# Patient Record
Sex: MALE | Race: WHITE | NOT HISPANIC OR LATINO | Employment: OTHER | URBAN - NONMETROPOLITAN AREA
[De-identification: names, ages, dates, MRNs, and addresses within clinical notes are randomized per-mention and may not be internally consistent; named-entity substitution may affect disease eponyms.]

---

## 2017-10-26 ENCOUNTER — TRANSFERRED RECORDS (OUTPATIENT)
Dept: HEALTH INFORMATION MANAGEMENT | Facility: HOSPITAL | Age: 72
End: 2017-10-26

## 2017-11-16 ENCOUNTER — HOSPITAL ENCOUNTER (OUTPATIENT)
Dept: OCCUPATIONAL THERAPY | Facility: HOSPITAL | Age: 72
Setting detail: THERAPIES SERIES
End: 2017-11-16
Attending: FAMILY MEDICINE
Payer: MEDICARE

## 2017-11-16 PROCEDURE — G8980 MOBILITY D/C STATUS: HCPCS | Mod: GO,CM

## 2017-11-16 PROCEDURE — 40000118 ZZH STATISTIC OT DEPT VISIT

## 2017-11-16 PROCEDURE — 97166 OT EVAL MOD COMPLEX 45 MIN: CPT | Mod: GO

## 2017-11-16 PROCEDURE — G8979 MOBILITY GOAL STATUS: HCPCS | Mod: GO,CM

## 2017-11-16 PROCEDURE — G8978 MOBILITY CURRENT STATUS: HCPCS | Mod: GO,CM

## 2017-11-28 NOTE — PROGRESS NOTES
" 11/16/17 1000   Quick Adds   Quick Adds Certification;Current Power Wheelchair   General Information (PT: include personal factors and/or comorbidities that impact the POC; OT: include additional occupational profile info)   Rehab Discipline OT   Funding Medicare/[facility] [place]   Service Outpatient;Occupational Therapy;Seating/Wheeled Mobility Evaluation   Height 5' 10.5\"   Weight 193#   Start Of Care Date 11/16/17   Referring Physician Anoop Bowling MD   Orders Evaluate And Treat As Indicated   Orders Date 11/26/17   Others Present at Evaluation RASHAD Salazar   Patient/Caregiver Goals To get a new wheelchair   Rehabilitation Technology Supplier [employer]   Phone Number of Supplier [phone]   Current Community Support Family/Friend Caregiver   Patient role/Employment history Disabled;Retired   Fall Risk Screen   Fall screen completed by OT   Have you fallen 2 or more times in the past year? No   Have you fallen and had an injury in the past year? No   Timed Up and Go score (seconds) unable   Is patient a fall risk? 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 lower extremity edema requiring use of compression socks.   Right shoulder is bone on bone, hypertension   Recent or Planned Surgeries na   Current Power Wheelchair    Quantum 600   Cushion Gel   Settings Used Home;Outdoor Community Mobility;Primary Means of Transportation   Power Wheelchair Comments Pt reports he doesn't want any extra weight added to the wheelchair   Home Accessibility   Living Environment House   Home Accessibility Comments Pt has had full accessibility in the house in current chair   Community ADL   Transportation Adapted Vehicle   Adapted Vehicle Features Ramp;Side Entry;Lock Down System;Adapted Driving " Controls  (electronics on the ramp are not working)   Community Mobility Requirements Medical Appointments;Shopping   Community ADL Comments Pt's wife usually drives pt sits in wheelchair   Cognitive/Visual/Hearing Status   Reported Problems slight memory loss   Cognitive/Vision/Hearing Comments alert and orientented, following directions well   ADL Status   Feeding Independent   Grooming/Hygiene Independent   Dressing Independent  (slow)   Toileting Uses Equipment;Incontinent  (uses oxybutin)   Bathing Uses Equipment   Meal Preparation Independent  (wife does most but he can)   Home Management Requires Assist   Fatigue   Reported Problems Pt reports he paces himself   Balance   Unsupported Sitting Balance Uses Upper Extremities for Balance   Sitting Balance in Chair Uses Upper Extremities for Balance   Standing Balance Unable   Ambulation   Ambulation Non Ambulatory   Transfers   Transfer Assist Independent   Transfer Method Squat Pivot/Scoot Boost;Sliding Board   Transfer Comments uses a stander 3x week for stretching and strengthening   Sleep/Rest   Sleep Surface/Equipment standard bed with pillow between legs   Wheelchair Ability   Wheelchair Ability Quick Adds Power Chair   Power Wheelchair Propulsion   Operate Power Wheelchair Standard Joystick;Alternative Controls;Independent   Neuromuscular   History of Pressure Sores Yes   Current Pressure Sores Yes   Pressure Sore location left ischial tuberosity   Pressure Sore type/size dime size   Pain Yes   Pain Location right shoulder   Pain Scale  6   Additional Pain Locations? Yes   Coordination UE Impaired;LE Impaired   Tone Hypotonic   Sensory Deficits Reported neuropathy, able to feel    Sensation on Seating Surface Impaired per Report   Head and Neck   Head and Neck Position Functional   Head Control Good   Upper Extremities   Shoulder Position Shoulder Depressed;Shoulder Protracted   UE ROM Shoulder ROM is WNL, elbow flexion 148 bilaterally, unable to extend  long and ring finger on right    UE Strength 4/5 at shoulders and elbows.    left 43#, right 24#   Dominance Right   Lower Extremities   Hip Position Neutral   Hip Position-Windswept Neutral   LE ROM no AROM   LE Strength 0/10   Foot Positioning WFL   LE Comments paraplegia   Education Assessment   Barriers to Learning No Barriers   Assessment/Plan   Criteria for Skilled Interventions Met Evaluation Only   Treatment Diagnosis impaired mobility   Influenced by the Following Impairments non ambulatory   Assessment of Occupational Performance 5 or more Performance Deficits   Identified Performance Deficits mobility within his home, toileting, sitting balance, skin integrity   Clinical Decision Making (Complexity) Moderate complexity   Planned Therapy Interventions Comments Recommend pt acquire a Group 3 Midwheel drive power wheelchair with tilt and recline for pressure relief and postural changes d/t h/o pressure ulcers and leg edema;  Positioning back for postural control and support; Headrest pad and removeable hardware to support head during tilt and transit and removable for transfers;  Power center mount elevating legrest with 2 actuators to support legs during tilt and reduce edema through positioning;  pressure relieving positioning cushion with gel insert d/t his history of pressure ulcers; incontinence cover d/t pt incontenence;  Transit tie downs and transit lap belt for safety during transportation; batteries to operate the chair, Retractable Joystick Mount to access environment and enable safe transfers, Tilt and Recline Actuators with Power Shear Reduction to operate the tilt and recline features; Thru Drive Control 2+ Actuators for operating power seating functions   Patient/family & other staff in agreement with plan of care Yes   1x Eval Only-Outpatient/Observation Medicare G-Code   G-code Mobility: Walking & Moving Around   Mobility: Walking & Moving Around   Mobility: Current Status , Goal  , Discharge -Evgx Only- Modifier the same for all G-codes CM: 80-99% impairment   Mobility: Current & Discharge Modifier Rationale-Eval Only pt dependent upon powered mobility for all mobility   Session Time   Total Evaluation Time 60   Total Session Time 60   Certification   Certification date from 11/16/17   Certification date to 11/16/17   Medical Diagnosis impaired mobility   Certification I certify the need for these services furnished under this plan of treatment and while under my care.  (Physician co-signature of this document indicates review and certification of the therapy plan).   GOALS   Goals Patient/family demonstrates understanding of equipment for independent mobility, including benefits/limitations   I certify the need for these services furnished under this plan of treatment and while under my care. (Physician co-signature of this document indicates review and certification of the therapy plan).      _____________________________     __________________________    ____________  Physician's Signature                 Date               Time

## 2019-11-23 ENCOUNTER — APPOINTMENT (OUTPATIENT)
Dept: GENERAL RADIOLOGY | Facility: HOSPITAL | Age: 74
End: 2019-11-23
Attending: EMERGENCY MEDICINE
Payer: MEDICARE

## 2019-11-23 ENCOUNTER — HOSPITAL ENCOUNTER (OUTPATIENT)
Facility: HOSPITAL | Age: 74
Setting detail: OBSERVATION
Discharge: HOME | End: 2019-11-25
Attending: FAMILY MEDICINE | Admitting: INTERNAL MEDICINE
Payer: MEDICARE

## 2019-11-23 ENCOUNTER — APPOINTMENT (OUTPATIENT)
Dept: CT IMAGING | Facility: HOSPITAL | Age: 74
End: 2019-11-23
Attending: EMERGENCY MEDICINE
Payer: MEDICARE

## 2019-11-23 DIAGNOSIS — I63.9 CEREBROVASCULAR ACCIDENT (CVA), UNSPECIFIED MECHANISM (H): ICD-10-CM

## 2019-11-23 DIAGNOSIS — I16.1 HYPERTENSIVE EMERGENCY: ICD-10-CM

## 2019-11-23 PROBLEM — R47.81 SLURRED SPEECH: Status: ACTIVE | Noted: 2019-11-23

## 2019-11-23 LAB
ABO + RH BLD: NORMAL
ABO + RH BLD: NORMAL
ALBUMIN SERPL-MCNC: 4 G/DL (ref 3.4–5)
ALP SERPL-CCNC: 80 U/L (ref 40–150)
ALT SERPL W P-5'-P-CCNC: 32 U/L (ref 0–70)
ANION GAP SERPL CALCULATED.3IONS-SCNC: 5 MMOL/L (ref 3–14)
APTT PPP: 40 SEC (ref 22–37)
AST SERPL W P-5'-P-CCNC: 22 U/L (ref 0–45)
BASOPHILS # BLD AUTO: 0 10E9/L (ref 0–0.2)
BASOPHILS NFR BLD AUTO: 0.6 %
BILIRUB SERPL-MCNC: 0.6 MG/DL (ref 0.2–1.3)
BLD GP AB SCN SERPL QL: NORMAL
BLOOD BANK CMNT PATIENT-IMP: NORMAL
BLOOD BANK CMNT PATIENT-IMP: NORMAL
BUN SERPL-MCNC: 12 MG/DL (ref 7–30)
CALCIUM SERPL-MCNC: 8.8 MG/DL (ref 8.5–10.1)
CHLORIDE SERPL-SCNC: 104 MMOL/L (ref 94–109)
CO2 SERPL-SCNC: 29 MMOL/L (ref 20–32)
CREAT SERPL-MCNC: 0.61 MG/DL (ref 0.66–1.25)
DIFFERENTIAL METHOD BLD: NORMAL
EOSINOPHIL # BLD AUTO: 0.1 10E9/L (ref 0–0.7)
EOSINOPHIL NFR BLD AUTO: 1.5 %
ERYTHROCYTE [DISTWIDTH] IN BLOOD BY AUTOMATED COUNT: 14.8 % (ref 10–15)
EST. AVERAGE GLUCOSE BLD GHB EST-MCNC: 137 MG/DL
GFR SERPL CREATININE-BSD FRML MDRD: >90 ML/MIN/{1.73_M2}
GLUCOSE BLDC GLUCOMTR-MCNC: 118 MG/DL (ref 70–99)
GLUCOSE BLDC GLUCOMTR-MCNC: 129 MG/DL (ref 70–99)
GLUCOSE BLDC GLUCOMTR-MCNC: 147 MG/DL (ref 70–99)
GLUCOSE SERPL-MCNC: 139 MG/DL (ref 70–99)
HBA1C MFR BLD: 6.4 % (ref 0–5.6)
HCT VFR BLD AUTO: 42.1 % (ref 40–53)
HGB BLD-MCNC: 14.9 G/DL (ref 13.3–17.7)
IMM GRANULOCYTES # BLD: 0 10E9/L (ref 0–0.4)
IMM GRANULOCYTES NFR BLD: 0.4 %
INR PPP: 2.4 (ref 0.86–1.14)
LYMPHOCYTES # BLD AUTO: 1.6 10E9/L (ref 0.8–5.3)
LYMPHOCYTES NFR BLD AUTO: 22.3 %
MCH RBC QN AUTO: 28.4 PG (ref 26.5–33)
MCHC RBC AUTO-ENTMCNC: 35.4 G/DL (ref 31.5–36.5)
MCV RBC AUTO: 80 FL (ref 78–100)
MONOCYTES # BLD AUTO: 0.6 10E9/L (ref 0–1.3)
MONOCYTES NFR BLD AUTO: 8.6 %
NEUTROPHILS # BLD AUTO: 4.8 10E9/L (ref 1.6–8.3)
NEUTROPHILS NFR BLD AUTO: 66.6 %
NRBC # BLD AUTO: 0 10*3/UL
NRBC BLD AUTO-RTO: 0 /100
PLATELET # BLD AUTO: 345 10E9/L (ref 150–450)
POTASSIUM SERPL-SCNC: 3.6 MMOL/L (ref 3.4–5.3)
PROT SERPL-MCNC: 8.7 G/DL (ref 6.8–8.8)
RBC # BLD AUTO: 5.25 10E12/L (ref 4.4–5.9)
SODIUM SERPL-SCNC: 138 MMOL/L (ref 133–144)
SPECIMEN EXP DATE BLD: NORMAL
TROPONIN I SERPL-MCNC: <0.015 UG/L (ref 0–0.04)
WBC # BLD AUTO: 7.2 10E9/L (ref 4–11)

## 2019-11-23 PROCEDURE — 99220 ZZC INITIAL OBSERVATION CARE,LEVL III: CPT | Performed by: INTERNAL MEDICINE

## 2019-11-23 PROCEDURE — 70498 CT ANGIOGRAPHY NECK: CPT | Mod: TC

## 2019-11-23 PROCEDURE — 25500064 ZZH RX 255 OP 636: Performed by: FAMILY MEDICINE

## 2019-11-23 PROCEDURE — 93005 ELECTROCARDIOGRAM TRACING: CPT

## 2019-11-23 PROCEDURE — 71045 X-RAY EXAM CHEST 1 VIEW: CPT | Mod: TC

## 2019-11-23 PROCEDURE — 96376 TX/PRO/DX INJ SAME DRUG ADON: CPT | Mod: 59

## 2019-11-23 PROCEDURE — 86901 BLOOD TYPING SEROLOGIC RH(D): CPT | Performed by: EMERGENCY MEDICINE

## 2019-11-23 PROCEDURE — 99291 CRITICAL CARE FIRST HOUR: CPT | Mod: 25

## 2019-11-23 PROCEDURE — 00000146 ZZHCL STATISTIC GLUCOSE BY METER IP

## 2019-11-23 PROCEDURE — 40000788 ZZHCL STATISTIC ESTIMATED AVERAGE GLUCOSE: Performed by: INTERNAL MEDICINE

## 2019-11-23 PROCEDURE — G0378 HOSPITAL OBSERVATION PER HR: HCPCS

## 2019-11-23 PROCEDURE — 86850 RBC ANTIBODY SCREEN: CPT | Performed by: EMERGENCY MEDICINE

## 2019-11-23 PROCEDURE — 25000132 ZZH RX MED GY IP 250 OP 250 PS 637: Mod: GY | Performed by: INTERNAL MEDICINE

## 2019-11-23 PROCEDURE — 86900 BLOOD TYPING SEROLOGIC ABO: CPT | Performed by: EMERGENCY MEDICINE

## 2019-11-23 PROCEDURE — 85025 COMPLETE CBC W/AUTO DIFF WBC: CPT | Performed by: EMERGENCY MEDICINE

## 2019-11-23 PROCEDURE — 25000128 H RX IP 250 OP 636: Performed by: INTERNAL MEDICINE

## 2019-11-23 PROCEDURE — 70450 CT HEAD/BRAIN W/O DYE: CPT | Mod: TC,59

## 2019-11-23 PROCEDURE — 99291 CRITICAL CARE FIRST HOUR: CPT | Performed by: FAMILY MEDICINE

## 2019-11-23 PROCEDURE — 25000128 H RX IP 250 OP 636: Performed by: FAMILY MEDICINE

## 2019-11-23 PROCEDURE — 96374 THER/PROPH/DIAG INJ IV PUSH: CPT | Mod: 59

## 2019-11-23 PROCEDURE — 80053 COMPREHEN METABOLIC PANEL: CPT | Performed by: EMERGENCY MEDICINE

## 2019-11-23 PROCEDURE — 83036 HEMOGLOBIN GLYCOSYLATED A1C: CPT | Performed by: INTERNAL MEDICINE

## 2019-11-23 PROCEDURE — 36415 COLL VENOUS BLD VENIPUNCTURE: CPT | Performed by: INTERNAL MEDICINE

## 2019-11-23 PROCEDURE — 84484 ASSAY OF TROPONIN QUANT: CPT | Performed by: EMERGENCY MEDICINE

## 2019-11-23 PROCEDURE — 93010 ELECTROCARDIOGRAM REPORT: CPT | Performed by: INTERNAL MEDICINE

## 2019-11-23 PROCEDURE — 85730 THROMBOPLASTIN TIME PARTIAL: CPT | Performed by: EMERGENCY MEDICINE

## 2019-11-23 PROCEDURE — 85610 PROTHROMBIN TIME: CPT | Performed by: EMERGENCY MEDICINE

## 2019-11-23 RX ORDER — LIDOCAINE 40 MG/G
CREAM TOPICAL
Status: DISCONTINUED | OUTPATIENT
Start: 2019-11-23 | End: 2019-11-25 | Stop reason: HOSPADM

## 2019-11-23 RX ORDER — ACETAMINOPHEN 325 MG/1
650 TABLET ORAL EVERY 4 HOURS PRN
Status: DISCONTINUED | OUTPATIENT
Start: 2019-11-23 | End: 2019-11-25 | Stop reason: HOSPADM

## 2019-11-23 RX ORDER — ASPIRIN 325 MG
325 TABLET ORAL DAILY
Status: DISCONTINUED | OUTPATIENT
Start: 2019-11-23 | End: 2019-11-25 | Stop reason: HOSPADM

## 2019-11-23 RX ORDER — ONDANSETRON 4 MG/1
4 TABLET, ORALLY DISINTEGRATING ORAL EVERY 6 HOURS PRN
Status: DISCONTINUED | OUTPATIENT
Start: 2019-11-23 | End: 2019-11-25 | Stop reason: HOSPADM

## 2019-11-23 RX ORDER — OXYBUTYNIN CHLORIDE 5 MG/1
5 TABLET, EXTENDED RELEASE ORAL DAILY
Status: DISCONTINUED | OUTPATIENT
Start: 2019-11-23 | End: 2019-11-25 | Stop reason: HOSPADM

## 2019-11-23 RX ORDER — DEXTROSE MONOHYDRATE 25 G/50ML
25-50 INJECTION, SOLUTION INTRAVENOUS
Status: DISCONTINUED | OUTPATIENT
Start: 2019-11-23 | End: 2019-11-25 | Stop reason: HOSPADM

## 2019-11-23 RX ORDER — LABETALOL 20 MG/4 ML (5 MG/ML) INTRAVENOUS SYRINGE
10 EVERY 10 MIN PRN
Status: DISCONTINUED | OUTPATIENT
Start: 2019-11-23 | End: 2019-11-23

## 2019-11-23 RX ORDER — LOSARTAN POTASSIUM 100 MG/1
100 TABLET ORAL DAILY
COMMUNITY

## 2019-11-23 RX ORDER — GLYBURIDE 5 MG/1
10 TABLET ORAL 2 TIMES DAILY WITH MEALS
COMMUNITY

## 2019-11-23 RX ORDER — ACETAMINOPHEN 650 MG/1
650 SUPPOSITORY RECTAL EVERY 4 HOURS PRN
Status: DISCONTINUED | OUTPATIENT
Start: 2019-11-23 | End: 2019-11-25 | Stop reason: HOSPADM

## 2019-11-23 RX ORDER — LABETALOL 20 MG/4 ML (5 MG/ML) INTRAVENOUS SYRINGE
10 EVERY 4 HOURS PRN
Status: DISCONTINUED | OUTPATIENT
Start: 2019-11-23 | End: 2019-11-25 | Stop reason: HOSPADM

## 2019-11-23 RX ORDER — ATENOLOL 25 MG/1
50 TABLET ORAL DAILY
Status: DISCONTINUED | OUTPATIENT
Start: 2019-11-23 | End: 2019-11-25 | Stop reason: HOSPADM

## 2019-11-23 RX ORDER — NICOTINE POLACRILEX 4 MG
15-30 LOZENGE BUCCAL
Status: DISCONTINUED | OUTPATIENT
Start: 2019-11-23 | End: 2019-11-25 | Stop reason: HOSPADM

## 2019-11-23 RX ORDER — NALOXONE HYDROCHLORIDE 0.4 MG/ML
.1-.4 INJECTION, SOLUTION INTRAMUSCULAR; INTRAVENOUS; SUBCUTANEOUS
Status: DISCONTINUED | OUTPATIENT
Start: 2019-11-23 | End: 2019-11-25 | Stop reason: HOSPADM

## 2019-11-23 RX ORDER — ONDANSETRON 2 MG/ML
4 INJECTION INTRAMUSCULAR; INTRAVENOUS EVERY 6 HOURS PRN
Status: DISCONTINUED | OUTPATIENT
Start: 2019-11-23 | End: 2019-11-25 | Stop reason: HOSPADM

## 2019-11-23 RX ADMIN — LABETALOL 20 MG/4 ML (5 MG/ML) INTRAVENOUS SYRINGE 10 MG: at 11:00

## 2019-11-23 RX ADMIN — OXYBUTYNIN CHLORIDE 5 MG: 5 TABLET, EXTENDED RELEASE ORAL at 20:24

## 2019-11-23 RX ADMIN — LABETALOL 20 MG/4 ML (5 MG/ML) INTRAVENOUS SYRINGE 10 MG: at 09:36

## 2019-11-23 RX ADMIN — LABETALOL 20 MG/4 ML (5 MG/ML) INTRAVENOUS SYRINGE 10 MG: at 16:33

## 2019-11-23 RX ADMIN — LABETALOL 20 MG/4 ML (5 MG/ML) INTRAVENOUS SYRINGE 10 MG: at 09:02

## 2019-11-23 RX ADMIN — IOHEXOL 50 ML: 350 INJECTION, SOLUTION INTRAVENOUS at 08:32

## 2019-11-23 RX ADMIN — LABETALOL 20 MG/4 ML (5 MG/ML) INTRAVENOUS SYRINGE 10 MG: at 21:15

## 2019-11-23 RX ADMIN — LABETALOL 20 MG/4 ML (5 MG/ML) INTRAVENOUS SYRINGE 10 MG: at 08:47

## 2019-11-23 RX ADMIN — ATENOLOL 50 MG: 25 TABLET ORAL at 17:41

## 2019-11-23 RX ADMIN — LABETALOL 20 MG/4 ML (5 MG/ML) INTRAVENOUS SYRINGE 10 MG: at 09:19

## 2019-11-23 RX ADMIN — ASPIRIN 325 MG ORAL TABLET 325 MG: 325 PILL ORAL at 12:36

## 2019-11-23 RX ADMIN — WARFARIN SODIUM 1.5 MG: 3 TABLET ORAL at 20:24

## 2019-11-23 ASSESSMENT — ENCOUNTER SYMPTOMS
DYSURIA: 0
FEVER: 0
ABDOMINAL PAIN: 0
DIZZINESS: 0
WHEEZING: 0
SORE THROAT: 0
COUGH: 0
CHILLS: 0
SHORTNESS OF BREATH: 0
WEAKNESS: 1
NECK PAIN: 1
NAUSEA: 0
PALPITATIONS: 0
BACK PAIN: 1
DIARRHEA: 0
VOMITING: 0

## 2019-11-23 ASSESSMENT — MIFFLIN-ST. JEOR: SCORE: 1634.13

## 2019-11-23 NOTE — PROGRESS NOTES
BP slowly improving- down to 180/100. Patient switched to tele box to make it easier for him to adjust and move himself. Neuro assessment remains the same as previously charted.

## 2019-11-23 NOTE — PHARMACY-ANTICOAGULATION SERVICE
Clinical Pharmacy - Warfarin Dosing Consult  Pharmacy has been consulted to manage this patient s warfarin therapy.  Indication: DVT/PE Prophylaxis  Therapy Goal: INR 2-3  OP Anticoag Clinic: . [facility]   Warfarin Prior to Admission: Yes  Warfarin PTA Regimen: 1 mg on Monday, 1.5 mg ROW  Significant drug interactions: ibuprofen, omeprazole, ASA  Dose Comments: Information needs to be clarified with [facility] once open on Monday     INR   Date Value Ref Range Status   11/23/2019 2.40 (H) 0.86 - 1.14 Final   06/23/2016 1.35 (H) 0.80 - 1.20 Final     Recommend warfarin 1.5 mg today.  Pharmacy will monitor Clem Bishop daily and order warfarin doses to achieve specified goal.      Please contact pharmacy as soon as possible if the warfarin needs to be held for a procedure or if the warfarin goals change.

## 2019-11-23 NOTE — PROGRESS NOTES
[facility] Inpatient Admission Note:    Patient admitted to 3128/3128-1 at approximately 1015 via cart accompanied by spouse from emergency room . Report received from Consuelo ROBERTS in SBAR format at 1030 via face to face in room. Patient transferred to bed via slide board.. Patient is alert and oriented X 3, denies pain; rates at 0 on 0-10 scale.  Patient oriented to room, unit, hourly rounding, and plan of care. Explained admission packet and patient handbook with patient bill of rights brochure. Will continue to monitor and document as needed.     Inpatient Nursing criteria listed below was met:    Health care directives status obtained and documented: Yes    Care Everywhere authorization obtained No    MRSA swab completed for patient 65 years and older: Yes    Patient identifies a surrogate decision maker: Yes If yes, who:Spouse- Myranda Contact Information:[phone]     If initial lactic acid >2.0, repeat lactic acid drawn within one hour of arrival to unit: NA. If no, state reason: N/A    Vaccination assessment and education completed: Yes   Vaccinations received prior to admission: Pneumovax yes  Influenza(seasonal)  YES   Vaccination(s) ordered: patient declines    Clergy visit ordered if patient requests: No    Skin issues/needs documented: N/A    Isolation Patient: no Education given, correct sign in place and documentation row added to PCS:  No    Fall Prevention Yes: Care plan updated, education given and documented, sticker and magnet in place: Yes    Care Plan initiated: Yes    Education Documented (including assessment): Yes    Patient has discharge needs : Yes If yes, please explain:Unsure at this time.

## 2019-11-23 NOTE — PROGRESS NOTES
Dr. Brooke updated on continued hypertension despite patient receiving another dose of Labetalol. New order placed for patient's home dose of PO Atenolol.

## 2019-11-23 NOTE — ED NOTES
Presents with wife with reports of slurred speech and R sided weakness since 2200 last HS. Also reports his R side feels numb. 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 Wife reports patient fell this morning due to weakness.

## 2019-11-23 NOTE — ED PROVIDER NOTES
History     Chief Complaint   Patient presents with     Slurred Speech     HPI  Clem Bishop is a 74 year old male who has a history of neurosarcoidosis, PE and DVT (coumadin).  He and his wife state that he has been paraplegic since 2005--he has control of his bladder and his bowels.  He does not know what level his paraplegia begins at.  He states that he has no movement of either leg.  He states that he started having symptoms last night at 2200 where his right arm felt weaker than usual, slurred speech and numbness on right side of mouth.  He awoke at 0530 and was unable to transfer himself and fell to his knees.  He did not hit his head nor his neck and he did not lose consciousness.    He says his right hand is numb.  He has no numbness above the wrist.  He has weakness of the right arm.  Wife also states that he was slurring his speech.  I do understand his speech without any difficulty.  She states that the slurred speech is still present.    He denies any recent illnesses.  He states that the back of his neck is sore but that happens intermittently.  He does have an L5 herniated disc.  Wife also states that his abdomen is more swollen left belt on him today.  The last time she had used that was about 6 months ago.    Social history  11/19: Clem lives at home with his wife.  He has been in a wheelchair since 2005.    Allergies:  No Known Allergies    Problem List:    There are no active problems to display for this patient.       Past Medical History:    Past Medical History:   Diagnosis Date     Hypertension        Past Surgical History:    Past Surgical History:   Procedure Laterality Date     COLONOSCOPY - HIM SCAN  04/19/2006    Colonoscopy at the [facility] ER 4/16/2006     EXCISE LESION EAR EXTERNAL Left 6/23/2016    Procedure: EXCISE LESION EAR EXTERNAL;  Surgeon: Von Suresh DO;  Location: [facility] OR     FLEXIBLE SIGMOIDOSCOPY - HIM SCAN  03/24/2000    Flex Sigmoidoscopy - Hyperplastic polyp        Family History:    No family history on file.    Social History:  Marital Status:   [2]  Social History     Tobacco Use     Smoking status: Not on file   Substance Use Topics     Alcohol use: Not on file     Drug use: Not on file        Medications:    atenolol (TENORMIN) 50 MG tablet  Calcium Carbonate (CALCIUM 600 PO)  METFORMIN HCL PO  OMEPRAZOLE PO  oxybutynin (DITROPAN-XL) 5 MG 24 hr tablet  VITAMIN D, CHOLECALCIFEROL, PO  WARFARIN SODIUM PO  ibuprofen (ADVIL,MOTRIN) 600 MG tablet          Review of Systems   Constitutional: Negative for chills and fever.   HENT: Negative for congestion, ear pain and sore throat.    Respiratory: Negative for cough, shortness of breath and wheezing.    Cardiovascular: Negative for chest pain and palpitations.   Gastrointestinal: Negative for abdominal pain, diarrhea, nausea and vomiting.   Genitourinary: Negative for dysuria.   Musculoskeletal: Positive for back pain (chronic) and neck pain (chronic).   Skin: Negative for rash.   Neurological: Positive for weakness. Negative for dizziness.   Psychiatric/Behavioral:        No depression or anxiety.       Physical Exam   BP: (!) 224/124  Pulse: 85  Heart Rate: 91  Temp: 97.4  F (36.3  C)  Resp: 18  Weight: 82.6 kg (182 lb)  SpO2: 95 %      Physical Exam  Vitals signs and nursing note reviewed.   Constitutional:       General: He is not in acute distress.     Appearance: He is well-developed.   HENT:      Head: Normocephalic and atraumatic.   Neck:      Musculoskeletal: Neck supple.   Cardiovascular:      Rate and Rhythm: Normal rate and regular rhythm.      Heart sounds: Normal heart sounds. No murmur. No friction rub. No gallop.    Pulmonary:      Effort: Pulmonary effort is normal. No respiratory distress.      Breath sounds: Normal breath sounds.   Abdominal:      Tenderness: There is no abdominal tenderness. There is no guarding or rebound.   Lymphadenopathy:      Cervical: No cervical adenopathy.   Skin:      General: Skin is warm and dry.   Neurological:      Mental Status: He is alert and oriented to person, place, and time.      Comments: Motor examination: the station and gait are normal.  The strength is full throughout, the bulk and tone are normal and there are no abnormal movements.s  The muscle strength is 5/5 in BUE and 0/5 BLE.    Neurological examination: mental status testing revealed  That the patient was awake and alert, the attention, orientation, concentration, language, memory and fund of knowledge were all normal.  The patient had no neglect or apraxia.    Cranial nerve examination: revealed that for cranial nerve   II: the pupils were reactive and the visual field were full  III, IV, and VI, the extraocular movements were full.    V: facial sensation intact in all 3 distributions of the 5th cranial nerve to touch  VII: facial movements are symmetric  VIII: hearing intact to snapping  IX & X: the soft palate is symmetric  XI: shoulder movements are symmetric  XII: tongue is midline.               ED Course        Procedures               Patient Vitals for the past 24 hrs:   BP Temp Temp src Pulse Heart Rate Resp SpO2 Weight   11/23/19 0916 (!) 189/101 97.8  F (36.6  C) Tympanic 86 -- 18 95 % --   11/23/19 0910 180/97 -- -- 85 86 15 96 % --   11/23/19 0905 (!) 179/102 -- -- 88 88 -- 94 % --   11/23/19 0900 (!) 203/107 -- -- 86 86 -- 95 % --   11/23/19 0856 -- -- -- -- 96 19 95 % --   11/23/19 0855 (!) 214/113 -- -- 90 92 -- 96 % --   11/23/19 0852 (!) 223/135 -- -- 93 -- 18 96 % --   11/23/19 0850 -- -- -- 93 105 14 96 % --   11/23/19 0845 (!) 217/119 -- -- 89 87 -- 94 % --   11/23/19 0843 -- -- -- -- 87 16 95 % --   11/23/19 0840 (!) 211/114 -- -- 85 84 -- 95 % --   11/23/19 0830 (!) 221/122 -- -- -- 91 -- 96 % --   11/23/19 0825 (!) 220/119 -- -- -- 90 -- 96 % --   11/23/19 0819 (!) 206/113 -- -- -- 88 18 96 % --   11/23/19 0814 (!) 251/123 97.4  F (36.3  C) Tympanic -- -- -- -- --   11/23/19 0811  (!) 251/123 -- -- -- 90 -- -- --   11/23/19 0804 (!) 224/124 -- -- -- 91 -- 95 % 82.6 kg (182 lb)       LABORATORY (REVIEWED AND INTERPRETED):  CBC BMP Liver Panel   Recent Labs   Lab Test 11/23/19 0813   WBC 7.2   HGB 14.9       Recent Labs   Lab Test 11/23/19 0813   POTASSIUM 3.6   CHLORIDE 104   BUN 12    Recent Labs   Lab Test 11/23/19 0813   BILITOTAL 0.6   ALT 32   AST 22        UA     DIP MICRO   No lab results found.    Invalid input(s): SPECGAV, GLUCONSUA, KETONESUA, BLOODUA, LEUKOCYTE Invalid input(s): RBCUA, WBCUA, BACTERIAUA, EPITHELIALUA       OTHER LABS   Recent Labs   Lab Test 11/23/19 0813   INR 2.40*            INTERVENTIONS:  Medications   labetalol (NORMODYNE/TRANDATE) syringe 10 mg (10 mg Intravenous Given 11/23/19 0919)   iohexol (OMNIPAQUE) 350 mg/mL solution 50 mL (50 mLs Intravenous Given 11/23/19 0832)   sodium chloride (PF) 0.9% PF flush 100 mL (100 mLs Intravenous Given 11/23/19 0833)       ECG (Reviewed and Interpreted by me):  NSR at 86 bpm.  LAD, RBBB    IMAGING (Reviewed and Interpreted by me):  Head  CT: negative  CTA: negative.     ED COURSE:  The patient has been seen and evaluated by me.  I have reviewed the medical records.      I spoke to Dr. Jacome, neurologist at the [facility]. He doesn't qualify for lytics    8:48 AM I heard from radiology and CT is negative.  He continues to have slightly slurred speech.  I do not objectively notice a difference in his strength between his right and left arms.  He states that his right arm is still weaker.    9:04 AM I spoke to Dr. Jacome, stroke neurologist, again. Depending on INR if subtherapeutic then increase his dosing.  If he is therapeutic and then based on the MRI that he will have on Monday we will determine if there is indeed a stroke and if there is that he will be started on 81 mg of aspirin    9:22 AM INR is 2.4    IMPRESSIONS AND PLAN:  CVA: Doing he has a history of neurosarcoidosis and has been paraplegic since 2005.   He does have control over his bowel and bladders.  He is on Coumadin due to a PE and DVT back in 2005.  His INR was 2.4.  He started having symptoms last night at 2200.  He felt weaker in his right arm, had slurred speech and numbness on the right side of his mouth.  He went to bed.  When he awoke at 0530 he was unable to transfer himself from his bed to his wheelchair and fell to his knees.  He did not hit his head or lose consciousness.  His wife helped him up.  His speech is improved.  He feels that his arm is somewhat improved but objectively I do not feel a difference in strength between his right and left arms.  Head CT was negative.  CT angiogram results are pending I do not see them in the chart yet.  His symptoms are consistent with a stroke but his symptoms are outside the window for thrombolytics.  He will be admitted, observed and have a MRI on Monday.  Dr. Zee, stroke neurologist, at the [facility] agreed that lytics were not indicated.  If a lesion is found consistent with a stroke on the MRI then consider adding aspirin 81 mg to his current Coumadin.    Hypertensive emergency: labetolol 10mg x 3.  Blood pressure upon arrival was 224/124.  After 3 doses of labetalol he was down 85/101.  I asked that the blood pressure not be lowered more than 20% at this time. Dr Bailey will monitor and adjust in the ICU as needed.         DIAGNOSIS:    ICD-10-CM    1. Cerebrovascular accident (CVA), unspecified mechanism (H) I63.9 ABO/Rh type and screen   2. Hypertensive emergency I16.1        DISCHARGE MEDICATIONS:  New Prescriptions    No medications on file         LOS:  5 Critical care time 30 minutes not counting assessment of EKG      11/23/2019  [facility]    Anoop Bowling Cassandra E, MD  11/23/19 [id number]

## 2019-11-23 NOTE — PROGRESS NOTES
Spoke with Dr. Maya regarding hypertension. Patient really had no improvement in BP after Labetalol was administered and diastolic BP is now getting higher. MD would like to continue monitoring for now. Would like to be updated of systolic BP reaches >220. Neurologically patient is mainly intact. Continues to have numbness in right side of lips, in right cheek, along with his right arm/hand. Does have a very minimal headache on the left anterior part of his head. Strength is equal in BUE. Patient is a paraplegic- does have mostly full sensation below the waist but is unable to move his lower extremities. Did pass bedside swallow eval and is tolerating his diet order. Did educate patient and his spouse that he would not be able to get his MRI until Monday.

## 2019-11-23 NOTE — H&P
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    R/o CVA: CT/CTA head negative.  Cannot get MRI until Monday.  Of note, patient does have chronic R sided weakness as well as lower ext paraplegia due to neurosarcoidosis.  - ASA daily  - neurochecks  - FLP  - permissive HTN (see below)  - MRI head when available  - speech therapy eval    HTNsive urgency: sbp > 200.  Got labetalol in the ED, patient now 186/99.  EKG with no signs of ischemia nor afib.  - permissive HTN, sbp up to 170 ok  - ICU telemetry monitoring    NIDDM2: patient is on meformin  - HA1C  - holding metformin while in the hospital  - accuchecks prn    H/o DVT: on warfarin, INR is therapeutic at 2.4  - will continue    GERD: continue outpatient PPT    FEN: oral diet as tolerated    DVT Prophylaxis: Warfarin    Code Status: Full Code    Disposition: Expected discharge in 1-2 days once bp improved, MRI obtained.    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  This morning he awoke and noticed right sided weakness compared to his baseline when he tried to transfer and instead he fell between his bed in his wheelchair.  He denies any trauma or any pain.  He came to the ED to get evaluated.  Stat CT of the head was negative, he was outside the lytic window.  His blood pressure was elevated >200  systolic.  He does complain of right-sided abdominal pain, however he denies any nausea, vomiting, or diarrhea.  Tele-stroke recommended MRI of brain, which cannot be obtained until Mon, so he will be admitted for this reason.      Past Medical History    I have reviewed this patient's medical history and updated it with pertinent information if needed.   Past Medical History:   Diagnosis Date     Hypertension        Past Surgical History   I have reviewed this patient's surgical history and updated it with pertinent information if needed.  Past Surgical History:   Procedure Laterality Date     COLONOSCOPY - HIM SCAN  04/19/2006    Colonoscopy at the [facility] 4/16/2006     EXCISE LESION EAR EXTERNAL Left 6/23/2016    Procedure: EXCISE LESION EAR EXTERNAL;  Surgeon: Von Suresh DO;  Location: [facility] OR     FLEXIBLE SIGMOIDOSCOPY - HIM SCAN  03/24/2000    Flex Sigmoidoscopy - Hyperplastic polyp       Prior to Admission Medications   Prior to Admission Medications   Prescriptions Last Dose Informant Patient Reported? Taking?   Calcium Carbonate (CALCIUM 600 PO) 11/22/2019 at Unknown time  Yes Yes   Sig: Take 1 tablet by mouth 2 times daily   METFORMIN HCL PO 11/22/2019 at Unknown time  Yes Yes   Sig: Take 500 mg by mouth 2 times daily (with meals)   OMEPRAZOLE PO 11/22/2019 at Unknown time  Yes Yes   Sig: Take by mouth every morning   VITAMIN D, CHOLECALCIFEROL, PO 11/22/2019 at Unknown time  Yes Yes   Sig: Take 400 Units by mouth 2 times daily   WARFARIN SODIUM PO 11/22/2019 at Unknown time  Yes Yes   Sig: Takes 1 mg on Monday, Wednesday and Friday;  Takes 1.5 mg on Sunday, Tuesday, Thursday, and Saturday.   atenolol (TENORMIN) 50 MG tablet 11/22/2019 at Unknown time  Yes Yes   Sig: Take 50 mg by mouth daily   ibuprofen (ADVIL,MOTRIN) 600 MG tablet More than a month at Unknown time  No No   Sig: Take 1 tablet (600 mg) by mouth every 6 hours as needed for pain   oxybutynin (DITROPAN-XL) 5 MG 24 hr tablet 11/22/2019  at Unknown time  Yes Yes   Sig: Take by mouth daily      Facility-Administered Medications: None     Allergies   No Known Allergies    Social History   I have reviewed this patient's social history and updated it with pertinent information if needed. Clem Bishop      Family History   I have reviewed this patient's family history and updated it with pertinent information if needed.   No family history on file.    Review of Systems   The 10 point Review of Systems is negative other than noted in the HPI or here.    Physical Exam   Temp: 97.8  F (36.6  C) Temp src: Tympanic BP: (!) 186/99 Pulse: 84 Heart Rate: 86 Resp: 18 SpO2: 94 % O2 Device: None (Room air)    Vital Signs with Ranges  Temp:  [97.4  F (36.3  C)-97.8  F (36.6  C)] 97.8  F (36.6  C)  Pulse:  [84-93] 84  Heart Rate:  [] 86  Resp:  [14-19] 18  BP: (179-251)/() 186/99  SpO2:  [94 %-96 %] 94 %  182 lbs 0 oz    Constitutional: AA, NAD, somewhat muffled speech  Eyes: PERRLA, no injection, no icterus  HEENT: atraumatic, normocephalic, no facial asymmetry  Respiratory: CTA b/l  Cardiovascular: S1 S2 RRR  GI: soft, NT, ND, + bowel sounds  Lymph/Hematologic: no palpable lymphadenopathy  Skin: no rashes, no lesions  Musculoskeletal: No edema, good tone, no deformities  Neurologic: oriented x 3, paraplegia LEs, strength in UEs intact b/l 4/5, right 3rd and 4th digit unable to extend completely  Psychiatric: appropriate affect    Data   Data reviewed today:  I personally reviewed imaging reports.  Recent Labs   Lab 11/23/19  0813   WBC 7.2   HGB 14.9   MCV 80      INR 2.40*      POTASSIUM 3.6   CHLORIDE 104   CO2 29   BUN 12   CR 0.61*   ANIONGAP 5   TREVER 8.8   *   ALBUMIN 4.0   PROTTOTAL 8.7   BILITOTAL 0.6   ALKPHOS 80   ALT 32   AST 22   TROPI <0.015          No results found for this or any previous visit (from the past 24 hour(s)).

## 2019-11-24 LAB
CHOLEST SERPL-MCNC: 138 MG/DL
GLUCOSE BLDC GLUCOMTR-MCNC: 141 MG/DL (ref 70–99)
GLUCOSE BLDC GLUCOMTR-MCNC: 154 MG/DL (ref 70–99)
GLUCOSE BLDC GLUCOMTR-MCNC: 170 MG/DL (ref 70–99)
GLUCOSE BLDC GLUCOMTR-MCNC: 231 MG/DL (ref 70–99)
HDLC SERPL-MCNC: 38 MG/DL
INR PPP: 2.65 (ref 0.86–1.14)
LDLC SERPL CALC-MCNC: 80 MG/DL
NONHDLC SERPL-MCNC: 100 MG/DL
TRIGL SERPL-MCNC: 100 MG/DL

## 2019-11-24 PROCEDURE — 25000128 H RX IP 250 OP 636: Performed by: INTERNAL MEDICINE

## 2019-11-24 PROCEDURE — 36415 COLL VENOUS BLD VENIPUNCTURE: CPT | Performed by: INTERNAL MEDICINE

## 2019-11-24 PROCEDURE — 85610 PROTHROMBIN TIME: CPT | Performed by: INTERNAL MEDICINE

## 2019-11-24 PROCEDURE — 80061 LIPID PANEL: CPT | Performed by: INTERNAL MEDICINE

## 2019-11-24 PROCEDURE — 25000128 H RX IP 250 OP 636

## 2019-11-24 PROCEDURE — G0378 HOSPITAL OBSERVATION PER HR: HCPCS

## 2019-11-24 PROCEDURE — 25000132 ZZH RX MED GY IP 250 OP 250 PS 637: Mod: GY | Performed by: INTERNAL MEDICINE

## 2019-11-24 PROCEDURE — 99225 ZZC SUBSEQUENT OBSERVATION CARE,LEVEL II: CPT | Performed by: INTERNAL MEDICINE

## 2019-11-24 PROCEDURE — 00000146 ZZHCL STATISTIC GLUCOSE BY METER IP

## 2019-11-24 RX ORDER — AMLODIPINE BESYLATE 5 MG/1
5 TABLET ORAL DAILY
Status: DISCONTINUED | OUTPATIENT
Start: 2019-11-25 | End: 2019-11-24

## 2019-11-24 RX ORDER — HYDRALAZINE HYDROCHLORIDE 20 MG/ML
10 INJECTION INTRAMUSCULAR; INTRAVENOUS EVERY 6 HOURS PRN
Status: DISCONTINUED | OUTPATIENT
Start: 2019-11-24 | End: 2019-11-24

## 2019-11-24 RX ORDER — HYDRALAZINE HYDROCHLORIDE 20 MG/ML
10 INJECTION INTRAMUSCULAR; INTRAVENOUS EVERY 4 HOURS PRN
Status: DISCONTINUED | OUTPATIENT
Start: 2019-11-24 | End: 2019-11-25 | Stop reason: HOSPADM

## 2019-11-24 RX ORDER — AMLODIPINE BESYLATE 5 MG/1
5 TABLET ORAL DAILY
Status: DISCONTINUED | OUTPATIENT
Start: 2019-11-24 | End: 2019-11-25 | Stop reason: HOSPADM

## 2019-11-24 RX ORDER — HYDRALAZINE HYDROCHLORIDE 20 MG/ML
INJECTION INTRAMUSCULAR; INTRAVENOUS
Status: COMPLETED
Start: 2019-11-24 | End: 2019-11-24

## 2019-11-24 RX ADMIN — HYDRALAZINE HYDROCHLORIDE 10 MG: 20 INJECTION INTRAMUSCULAR; INTRAVENOUS at 17:37

## 2019-11-24 RX ADMIN — ATENOLOL 50 MG: 25 TABLET ORAL at 08:28

## 2019-11-24 RX ADMIN — LABETALOL 20 MG/4 ML (5 MG/ML) INTRAVENOUS SYRINGE 10 MG: at 09:30

## 2019-11-24 RX ADMIN — ASPIRIN 325 MG ORAL TABLET 325 MG: 325 PILL ORAL at 08:28

## 2019-11-24 RX ADMIN — AMLODIPINE BESYLATE 5 MG: 5 TABLET ORAL at 17:37

## 2019-11-24 RX ADMIN — OXYBUTYNIN CHLORIDE 5 MG: 5 TABLET, EXTENDED RELEASE ORAL at 19:59

## 2019-11-24 RX ADMIN — HYDRALAZINE HYDROCHLORIDE 10 MG: 20 INJECTION INTRAMUSCULAR; INTRAVENOUS at 14:12

## 2019-11-24 RX ADMIN — WARFARIN SODIUM 1.5 MG: 3 TABLET ORAL at 19:59

## 2019-11-24 ASSESSMENT — MIFFLIN-ST. JEOR: SCORE: 1634.13

## 2019-11-24 NOTE — PLAN OF CARE
Remains hypertensive. Neuro status remained the same t/o day- no new deficits noted. Patient and spouse in agreement with plan to monitor, manage BP, and get MRI Monday unless patient status changes.     Face to face report given with opportunity to observe patient.    Report given to Elsa Begum, ALEJANDRA   11/23/2019  9:32 PM

## 2019-11-24 NOTE — PROGRESS NOTES
MD updated on hypertension 200's/100's with HR 60-65. New orders placed for PO Norvasc and frequency of Hydralazine was increased. Patient given both meds.     Patient does continue to exert himself even with education. He attempts to transfer himself alone and adjust self in bed without help which causes him to utilize most of his strength. Have tried to explain to patient that this is most likely not helping his high BP. Patient is afraid of loosing the strength he has; so he is insistent on trying to continue to do things for himself.

## 2019-11-24 NOTE — PROGRESS NOTES
JAZZY MENDOZA.  Patient visit during  rounds. Patient alert and orientated and talkative.  No spiritual care requests at this time.

## 2019-11-24 NOTE — PHARMACY-ANTICOAGULATION SERVICE
Pharmacy Consult-Coumadin Assessment Day #2    Clem Bishop is a 74 year old male admitted on 11/23/2019 with possible stroke, hypertensive urgency    Primary Indication(s) for Anticoagulation: DVT/PE prophylaxis     Goal INR: 2-3     FYI, patient is followed by the Anticoagulation/Protime Clinic at: [facility]    Patient Active Problem List   Diagnosis     Slurred speech     Patient previously anticoagulated on Coumadin at a dose of 10 mg/week; dosed as: 1 mg on Mon, 1.5 mg ROW    Factors that may increase patient's sensitivity to warfarin (Coumadin) include: ASA (new med), ibuprofen & omeprazole (home meds not ordered here)    Factors that may decrease patient's sensitivity to warfarin (Coumadin) include: -    Dietary Considerations: moderate consistent CHO     Anticoagulation Dose History     Recent Dosing and Labs Latest Ref Rng & Units 6/23/2016 11/23/2019 11/24/2019    Warfarin 1.5 mg - - 1.5 mg -    INR 0.86 - 1.14 1.35(H) 2.40(H) 2.65(H)        Assessment/Plan: Will give another 1.5 mg warfarin tonight. The patient requests the dose gets scheduled between [id number] as this is what he does at home. Will time appropriately.     Thank You for the Consult. Will continue to follow.    Sandra Choi RPH ....................  11/24/2019   10:39 AM

## 2019-11-24 NOTE — PLAN OF CARE
"Most recent vitals: Blood Pressure 163/93   Pulse 78   Temperature 98.4  F (36.9  C) (Temporal)   Respiration 16   Height 1.803 m (5' 11\")   Weight 88.2 kg (194 lb 7.1 oz)   Oxygen Saturation 96%   Body Mass Index 27.12 kg/m      Pain Management:  denies pain    Orientation:  A&O x 4, PERRL, BUE strong, BLE absent from neurosarcoidosis & paraplegia, no neurological changes overnight, still has numbness/tingling to right side of face/lips and right upper extremity.  He states it has gotten better throughout the night.      Cardiac:  SR BBB 60's-70's, pulses BUE 2+, BLE 1+, 2+ edema in legs, 3+ edema in feet, hypertensive 150's-180's/60's-90's    Respiratory:  LS clear equal bilaterally on RA     GI:  BS active, LBM 11/24    :  using urinal, voiding clear/yellow    Nutrition:  carb controlled diet, BG stable    Mobility: Transferred with assist of 2 from bed to chair, transferred to toilet SBA with grab bars    Safety:  Uses call light appropriately, call light within reach, requires supervision with transfers    Comments: No neurological changes overnight, still has numbness/tingling to right side of face/lips and right upper extremity.  He states it has gotten better throughout the night.  Pt's hypertension has improved throughout the night.  He did receive labetalol at 2115.  Will continue to monitor.       11/24/2019  5:35 AM  Elsa Gilliam RN    Face to face report given with opportunity to observe patient.    Report given to ALEJANDRA Gill RN   11/24/2019  5:45 AM    "

## 2019-11-24 NOTE — PROGRESS NOTES
MD updated on hypertension with HR in the 50's-60's. MD will order Hydralazine at this time to be given instead of the Labetalol d/t HR.

## 2019-11-24 NOTE — PROGRESS NOTES
Assessment completed see flowsheet.    LOC: alert, oriented, pleasant  Others present: Patient and wifeMyranda     Dx: CVA/hypertensive emergency   Chronic Disease Management: DM, neurosarcoidosis     Lives with: wifeMyranda   Living at:  Home, handicap accessible  Transportation: YES Reliable wheelchair accessible van; wifeMyranda drives     Primary PCP: Anoop Bowling Dr.- neurologist with Dr. Maribel Roth for dental and [facility]   Insurance:  Medicare and BCBS out of State  Medicare [place] letter reviewed with Giorgi.    Support System:  Family, neighbors  Homecare/PCA: not connected   /County Services:   Not connected   : YES [employer]; not connected, have previously been advised income too high     How was the [facility] notification completed: n/a    Health Care Directive: YES wifeMyranda and step-daughterChucky identified; copy sent to medical records   Guardian: no   POA: no     Pharmacy: [facility]   Meds management: YES independently manages, utilizes a pill box     Adequate Resources for needs (housing, utilities, food/med): YES  Household chores: Myranda does majority, Clem will complete dishes and vacuum; neighbors and brotherShane will help with snow removal   Work/community/social activity: YES as desired, enjoys carving walking sticks out of [place]     ADLs: independent; requires some assistance from wife   Ambulation:wheelchair bound; independent with transfers at baseline  Falls: denies   Nutrition: no concerns  Sleep: no concerns    Equipment used:  Motorized wheelchair, grab bars, scooter, ramp, has transfer board if necessary   Oxygen supplier: no      Does the supplier have valid oxygen orders: n/a    Mental health: denies   Substance abuse: 1-2 drinks a week   Exposure to violence/abuse: denies  Stressors: denies     Able to Return to Prior Living Arrangements: YES    Choice of Vendor: n/a    Barriers: no barriers identified     WARNER: none      Plan: return home via wife, Myranda

## 2019-11-24 NOTE — PROGRESS NOTES
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     R/o CVA: CT/CTA head negative.  Cannot get MRI until Monday.  Of note, patient does have chronic R sided weakness as well as lower ext paraplegia due to neurosarcoidosis.  Neurochecks have been stable, symptoms of right sided weakness and facial numbness improving.  LDL is 80.  - ASA daily  - neurochecks  - permissive HTN (see below)  - MRI head on Mon  - speech therapy eval     HTNsive urgency: sbp is still intermittently greater than 200.  Labetalol prn, restarted home atenolol.  EKG with no signs of ischemia nor afib.  - permissive HTN, sbp up to 170 ok  - ICU telemetry monitoring     NIDDM2: patient is on meformin.  HA1C is 6.4.  - holding metformin while in the hospital  - accuchecks prn     H/o DVT: on warfarin, INR is therapeutic at 2.65  - will continue     GERD: continue outpatient PPI     FEN: oral diet as tolerated     DVT Prophylaxis: Warfarin     Code Status: Full Code     Disposition: Expected discharge in 1-2 days once bp improved, MRI obtained.    Sara Maya MD      Interval History   Patient seen in room, sitting in wheelchair.  Doing well, states that his right handed weakness and numbness are nearly resolved.  No acute events overnight, no new symptoms.    -Data reviewed today: I reviewed all new labs and imaging results over the last 24 hours. I personally reviewed no images or EKG's today.    Physical Exam   Temp: 98.6  F (37  C) Temp src: Temporal BP: 170/95 Pulse: 65 Heart Rate: 89 Resp: 16 SpO2: 95 % O2 Device: None (Room air)    Vitals:    11/23/19 0804 11/23/19 1020 11/24/19 0509   Weight: 82.6 kg (182 lb) 88.2 kg (194 lb 7.1 oz) 88.2 kg (194 lb 7.1 oz)     Vital  Signs with Ranges  Temp:  [97.4  F (36.3  C)-98.6  F (37  C)] 98.6  F (37  C)  Pulse:  [65-93] 65  Heart Rate:  [] 89  Resp:  [12-23] 16  BP: (142-251)/() 170/95  SpO2:  [91 %-98 %] 95 %    Intake/Output Summary (Last 24 hours) at 11/24/2019 0740  Last data filed at 11/24/2019 0500  Gross per 24 hour   Intake 240 ml   Output 1325 ml   Net -1085 ml       Peripheral IV 11/23/19 Right (Active)   Site Assessment [facility] 11/24/2019  5:00 AM   Line Status Saline locked;Checked every 1-2 hour 11/24/2019  5:00 AM   Phlebitis Scale 0-->no symptoms 11/24/2019  5:00 AM   Infiltration Scale 0 11/24/2019  5:00 AM   Dressing Intervention New dressing  11/23/2019  8:14 AM   Number of days: 1       Peripheral IV 11/23/19 Left Lower forearm (Active)   Site Assessment [facility] 11/24/2019  5:00 AM   Line Status Saline locked;Checked every 1-2 hour 11/24/2019  5:00 AM   Phlebitis Scale 0-->no symptoms 11/24/2019  5:00 AM   Infiltration Scale 0 11/24/2019  5:00 AM   Number of days: 1     No line/device    Constitutional: AA, NAD, speech is still somewhat muffled, ? If this is his baseline?  Eyes: PERRLA, no injection, no icterus  HEENT: atraumatic, normocephalic, no facial asymmetry  Respiratory: CTA b/l  Cardiovascular: S1 S2 RRR  GI: soft, NT, ND, + bowel sounds  Lymph/Hematologic: no palpable lymphadenopathy  Skin: no rashes, no lesions  Musculoskeletal: No edema, good tone, no deformities  Neurologic: oriented x 3, paraplegia LEs, strength in UEs intact b/l 4/5, right 3rd and 4th digit unable to extend completely  Psychiatric: appropriate affect      Medications     Warfarin Therapy Reminder         aspirin  325 mg Oral Daily     atenolol  50 mg Oral Daily     insulin aspart  1-7 Units Subcutaneous TID AC     insulin aspart  1-5 Units Subcutaneous At Bedtime     oxybutynin ER  5 mg Oral Daily     sodium chloride (PF)  3 mL Intracatheter Q8H       Data   Recent Labs   Lab 11/24/19  0444 11/23/19  0813   WBC  --  7.2   HGB  --   14.9   MCV  --  80   PLT  --  345   INR 2.65* 2.40*   NA  --  138   POTASSIUM  --  3.6   CHLORIDE  --  104   CO2  --  29   BUN  --  12   CR  --  0.61*   ANIONGAP  --  5   TREVER  --  8.8   GLC  --  139*   ALBUMIN  --  4.0   PROTTOTAL  --  8.7   BILITOTAL  --  0.6   ALKPHOS  --  80   ALT  --  32   AST  --  22   TROPI  --  <0.015          Recent Results (from the past 24 hour(s))   CT Head w/o Contrast    Narrative    Exam: CT HEAD W/O CONTRAST    Clinical history:74 years Male Neuro deficit(s), subacute    Comparisons:None    Technique: Axial CT imaging of the head was performed Without  intervenous contrast.    FINDINGS:   Ventricles and sulci are symmetric. The gray-white matter  differentiation throughout the brain is well maintained. There is  moderate periventricular white matter change of chronic small vessel  ischemic disease. There is no evidence of intracranial mass or  hemorrhage. Visualized portions of the paranasal sinuses and mastoid  air cells are well aerated. There is no evidence of skull fracture.      Impression    IMPRESSION: No acute changes.    ERJI LYNN MD   CTA Head Neck with Contrast    Narrative    CTA  HEAD NECK WITH CONTRAST    HISTORY: 74 years Male Neuro deficit(s), subacute; Evaluate for  dissection/thromboembolism    COMPARISON: None    TECHNIQUE: Contrast-enhanced CT angiography of the neck and head was  performed with intervenous contrast. Multiplanar reconstructions and  MIP, volume rendered and 3-D MIP reconstructions were obtained on a  3-D workstation..    FINDINGS:    Right neck:            Brachiocephalic artery: patent            Common carotid artery:  patent            Internal carotid artery:Widely patent there is mild  calcified atherosclerotic disease of the carotid bulb.            Vertebral artery:Widely patent                Left neck:            Common carotid artery: Widely patent            Internal carotid artery:Widely patent there is  calcified  atherosclerotic plaque at the carotid bulb without significant  stenosis.            Vertebral artery:Widely patent    Head:           Anatomy: There is fetal origin of the left posterior cerebral  artery, this is a normal anatomic variant.           Anterior circulation:The vessels are patent without evidence  of abrupt cut off. There is no evidence of aneurysm.           Posterior circulation:The vessels are patent. There is no  abrupt cut off. There is no evidence of aneurysm      Impression    IMPRESSION:No acute findings. There is no evidence of significant  carotid artery stenosis. There is no evidence of abrupt vessel cut off  or intracranial aneurysm.    REJI LYNN MD   XR Chest Port 1 View    Narrative    XR CHEST PORT 1 VW    HISTORY: 74 yearsMale stroke    TECHNIQUE: A single view of the chest was performed    COMPARISON: 3/7/2008    FINDINGS: There is elevation of the left hemidiaphragm and left  basilar atelectasis. The upper left lung and right lung are clear.        Impression    IMPRESSION: Elevation left hemidiaphragm suggesting left diaphragmatic  paralysis, this was present on the prior study. There is left basilar  atelectasis. Lungs are otherwise clear.    MD Sara LANCE MD

## 2019-11-25 ENCOUNTER — APPOINTMENT (OUTPATIENT)
Dept: MRI IMAGING | Facility: HOSPITAL | Age: 74
End: 2019-11-25
Attending: INTERNAL MEDICINE
Payer: MEDICARE

## 2019-11-25 VITALS
TEMPERATURE: 98.1 F | WEIGHT: 194.45 LBS | BODY MASS INDEX: 27.22 KG/M2 | HEART RATE: 89 BPM | HEIGHT: 71 IN | SYSTOLIC BLOOD PRESSURE: 197 MMHG | OXYGEN SATURATION: 93 % | RESPIRATION RATE: 16 BRPM | DIASTOLIC BLOOD PRESSURE: 94 MMHG

## 2019-11-25 LAB — INR PPP: 2.69 (ref 0.86–1.14)

## 2019-11-25 PROCEDURE — 70553 MRI BRAIN STEM W/O & W/DYE: CPT | Mod: TC

## 2019-11-25 PROCEDURE — 25500064 ZZH RX 255 OP 636: Performed by: RADIOLOGY

## 2019-11-25 PROCEDURE — G0378 HOSPITAL OBSERVATION PER HR: HCPCS

## 2019-11-25 PROCEDURE — 99217 ZZC OBSERVATION CARE DISCHARGE: CPT | Performed by: INTERNAL MEDICINE

## 2019-11-25 PROCEDURE — 25000132 ZZH RX MED GY IP 250 OP 250 PS 637: Mod: GY | Performed by: INTERNAL MEDICINE

## 2019-11-25 PROCEDURE — 85610 PROTHROMBIN TIME: CPT | Performed by: INTERNAL MEDICINE

## 2019-11-25 PROCEDURE — 36415 COLL VENOUS BLD VENIPUNCTURE: CPT | Performed by: INTERNAL MEDICINE

## 2019-11-25 PROCEDURE — A9585 GADOBUTROL INJECTION: HCPCS | Performed by: RADIOLOGY

## 2019-11-25 RX ORDER — AMLODIPINE BESYLATE 5 MG/1
5 TABLET ORAL DAILY
Qty: 30 TABLET | Refills: 0 | Status: SHIPPED | OUTPATIENT
Start: 2019-11-26 | End: 2019-11-25

## 2019-11-25 RX ORDER — GADOBUTROL 604.72 MG/ML
7.5 INJECTION INTRAVENOUS ONCE
Status: COMPLETED | OUTPATIENT
Start: 2019-11-25 | End: 2019-11-25

## 2019-11-25 RX ORDER — GADOBUTROL 604.72 MG/ML
2 INJECTION INTRAVENOUS ONCE
Status: COMPLETED | OUTPATIENT
Start: 2019-11-25 | End: 2019-11-25

## 2019-11-25 RX ORDER — SIMVASTATIN 10 MG
10 TABLET ORAL AT BEDTIME
Qty: 30 TABLET | Refills: 0 | Status: SHIPPED | OUTPATIENT
Start: 2019-11-25

## 2019-11-25 RX ORDER — AMLODIPINE BESYLATE 10 MG/1
10 TABLET ORAL DAILY
Qty: 30 TABLET | Refills: 0 | Status: SHIPPED | OUTPATIENT
Start: 2019-11-26

## 2019-11-25 RX ORDER — GLYBURIDE 2.5 MG/1
2.5 TABLET ORAL 2 TIMES DAILY WITH MEALS
Status: DISCONTINUED | OUTPATIENT
Start: 2019-11-25 | End: 2019-11-25 | Stop reason: HOSPADM

## 2019-11-25 RX ORDER — LOSARTAN POTASSIUM 50 MG/1
50 TABLET ORAL DAILY
Status: DISCONTINUED | OUTPATIENT
Start: 2019-11-25 | End: 2019-11-25 | Stop reason: HOSPADM

## 2019-11-25 RX ADMIN — ASPIRIN 325 MG ORAL TABLET 325 MG: 325 PILL ORAL at 08:12

## 2019-11-25 RX ADMIN — AMLODIPINE BESYLATE 5 MG: 5 TABLET ORAL at 08:12

## 2019-11-25 RX ADMIN — LOSARTAN POTASSIUM 50 MG: 50 TABLET, FILM COATED ORAL at 08:11

## 2019-11-25 RX ADMIN — GADOBUTROL 7.5 ML: 604.72 INJECTION INTRAVENOUS at 10:09

## 2019-11-25 RX ADMIN — GADOBUTROL 2 ML: 604.72 INJECTION INTRAVENOUS at 10:09

## 2019-11-25 RX ADMIN — ATENOLOL 50 MG: 25 TABLET ORAL at 08:11

## 2019-11-25 RX ADMIN — GLYBURIDE 2.5 MG: 2.5 TABLET ORAL at 09:26

## 2019-11-25 NOTE — PROGRESS NOTES
Name: Clem Bishop    MRN#: [id number]    Reason for Hospitalization: Hypertensive emergency [I16.1]  Cerebrovascular accident (CVA), unspecified mechanism (H) [I63.9]    WARNER: none    Discharge Date: 11/25/2019    Patient / Family response to discharge plan: agreeable     Follow-Up Appt: No future appointments.    Other Providers (Care Coordinator, County Services, PCA services etc): No    Discharge Disposition: home via wife, JAMEL Rutledge

## 2019-11-25 NOTE — PROGRESS NOTES
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     R/o CVA: CT/CTA head negative.  Cannot get MRI until Monday.  Of note, patient does have chronic R sided weakness as well as lower ext paraplegia due to neurosarcoidosis.  Neurochecks have been stable, symptoms of right sided weakness and facial numbness improving.  LDL is 80.  - ASA daily  - neurochecks have been stable  - permissive HTN (see below)  - await MRI today     HTNsive urgency: sbp is still intermittently greater than 200.  Labetalol prn, restarted home atenolol.  EKG with no signs of ischemia nor afib.  - will restart his home losartan at this point  - added amlodipine 5 mg  - hydralazine prn  - telemetry monitoring     NIDDM2: patient is on meformin.  HA1C is 6.4.  - holding metformin while in the hospital  - accuchecks prn     H/o DVT: on warfarin, INR is therapeutic at 2.69  - will continue     GERD: continue outpatient PPI     FEN: oral diet as tolerated     DVT Prophylaxis: Warfarin     Code Status: Full Code     Disposition: Expected discharge once bp improved, MRI obtained, perhaps today    Sara aMya MD      Interval History   Patient seen in room, sitting in wheelchair.  Doing well, states that his right handed weakness are nearly resolved.  Facial numbness has resolved.  No acute events overnight, no new symptoms.    -Data reviewed today: I reviewed all new labs and imaging results over the last 24 hours. I personally reviewed no images or EKG's today.    Physical Exam   Temp: 98.6  F (37  C) Temp src: Temporal BP: (!) 195/99 Pulse: 74 Heart Rate: 72 Resp: 16 SpO2: 96 % O2 Device: None (Room air)    Vitals:    11/23/19 0804 11/23/19 1020 11/24/19 0509   Weight: 82.6 kg  (182 lb) 88.2 kg (194 lb 7.1 oz) 88.2 kg (194 lb 7.1 oz)     Vital Signs with Ranges  Temp:  [97.6  F (36.4  C)-98.6  F (37  C)] 98.6  F (37  C)  Pulse:  [64-90] 74  Heart Rate:  [60-75] 72  Resp:  [14-16] 16  BP: (119-251)/() 195/99  SpO2:  [89 %-97 %] 96 %      Intake/Output Summary (Last 24 hours) at 11/25/2019 0759  Last data filed at 11/25/2019 0600  Gross per 24 hour   Intake 240 ml   Output 1350 ml   Net -1110 ml       Peripheral IV 11/23/19 Right (Active)   Site Assessment [facility] 11/25/2019  4:00 AM   Line Status Saline locked;Checked every 1-2 hour 11/25/2019  4:00 AM   Phlebitis Scale 0-->no symptoms 11/25/2019  4:00 AM   Infiltration Scale 0 11/25/2019  4:00 AM   Dressing Intervention New dressing  11/23/2019  8:14 AM   Number of days: 2       Peripheral IV 11/23/19 Left Lower forearm (Active)   Site Assessment [facility] 11/25/2019  4:00 AM   Line Status Saline locked;Checked every 1-2 hour 11/25/2019  4:00 AM   Phlebitis Scale 0-->no symptoms 11/25/2019  4:00 AM   Infiltration Scale 0 11/25/2019  4:00 AM   Number of days: 2     No line/device    Constitutional: AA, NAD  Eyes: PERRLA, no injection, no icterus  HEENT: atraumatic, normocephalic, no facial asymmetry  Respiratory: CTA b/l  Cardiovascular: S1 S2 RRR  GI: soft, NT, ND, + bowel sounds  Lymph/Hematologic: no palpable lymphadenopathy  Skin: no rashes, no lesions  Musculoskeletal: No edema, good tone, no deformities  Neurologic: oriented x 3, paraplegia LEs, strength in UEs intact b/l 4/5, right 3rd and 4th digit unable to extend completely  Psychiatric: appropriate affect      Medications     Warfarin Therapy Reminder         amLODIPine  5 mg Oral Daily     aspirin  325 mg Oral Daily     atenolol  50 mg Oral Daily     insulin aspart  1-7 Units Subcutaneous TID AC     insulin aspart  1-5 Units Subcutaneous At Bedtime     oxybutynin ER  5 mg Oral Daily     sodium chloride (PF)  3 mL Intracatheter Q8H       Data   Recent Labs   Lab 11/25/19  7773  11/24/19  0444 11/23/19  0813   WBC  --   --  7.2   HGB  --   --  14.9   MCV  --   --  80   PLT  --   --  345   INR 2.69* 2.65* 2.40*   NA  --   --  138   POTASSIUM  --   --  3.6   CHLORIDE  --   --  104   CO2  --   --  29   BUN  --   --  12   CR  --   --  0.61*   ANIONGAP  --   --  5   TREVER  --   --  8.8   GLC  --   --  139*   ALBUMIN  --   --  4.0   PROTTOTAL  --   --  8.7   BILITOTAL  --   --  0.6   ALKPHOS  --   --  80   ALT  --   --  32   AST  --   --  22   TROPI  --   --  <0.015          No results found for this or any previous visit (from the past 24 hour(s)).    Sara Maya MD

## 2019-11-25 NOTE — PLAN OF CARE
Hypertension continues. Neuro status continues to remain the same. Speech intermittently slurred t/o shift. Numbness to right side of lips, check, arm, and hand basically resolved at this point. Patient seems more down today about his neurosarcoidosis. When patient was asked about stress and anxiety he really did not answer. Wife at bedside this morning. Besides BP remains vitally stable and denies pain.

## 2019-11-25 NOTE — PHARMACY-ANTICOAGULATION SERVICE
Pharmacy Consult-Coumadin Assessment Day #3    Clem Bishop is a 74 year old male admitted on 11/23/2019 with <principal problem not specified>    Primary Indication(s) for Anticoagulation: DVT/PE prophylaxis (DVTs while on warfarin)    Goal INR:2-3    FYI, patient is followed by the Anticoagulation/Protime Clinic at: [facility] ([place] manages warfarin, but INRs are drawn in [place])    Patient Active Problem List   Diagnosis     Slurred speech       Patient previously anticoagulated on Coumadin at a dose of 10 mg/week; dosed as: 1.5 mg daily, except 1 mg on Mondays (takes at 0554-4390 at home)    Factors that may increase patient's sensitivity to warfarin/increase bleeding risk (Coumadin) include: Advanced age, aspirin,     Factors that may decrease patient's sensitivity to warfarin (Coumadin) include: none      Anticoagulation Dose History     Recent Dosing and Labs Latest Ref Rng & Units 11/23/2019 11/24/2019 11/25/2019    Warfarin 1.5 mg - 1.5 mg 1.5 mg -    INR 0.86 - 1.14 2.40(H) 2.65(H) 2.69(H)          Assessment/Plan: INR therapeutic today; and patient is being discharged home.  Patient may continue on usual home dose of 1.5 mg at bedtime, except 1 mg on Mondays.  Patient should have INR checked at [facility] on 11/27/19.    Thank You for the Consult. Will continue to follow.    Troy Glover [facility] ....................  11/25/2019   11:04 AM

## 2019-11-25 NOTE — PLAN OF CARE
PTA med list reviewed. [facility] and they verified his medication history with me. Pt also had a med list written out.     The only thing I couldn't get absolute verification on was the warfarin.Waiting for a phone call return from [facility] coumadin nurse. [facility] had the 1 mg tab on file and the patient seemed to know what he was taking. Notified the pharmacist.     No other pertinent changes made.     Jody Carballo on 11/25/2019 at 11:30 AM

## 2019-11-25 NOTE — PROGRESS NOTES
Face to face report given with opportunity to observe patient.    Report given to Elsa Begum RN   11/24/2019  7:03 PM

## 2019-11-25 NOTE — DISCHARGE INSTRUCTIONS
Follow up with [facility] to see Laxmi Lam on Tuesday December 3rd at 11:00 AM. Call [phone] to reschedule. Also will need INR on 11/27/19. Left message with lab.

## 2019-11-25 NOTE — PROGRESS NOTES
Clem Bishop 74 year old    Returned from MRI  Exam Performed : MRI BRAIN W/WO  Pushed to Reading Service?: No  Tolerated Procedure : well  May resume previous diet : Yes  Time Returned to Unit : 10:30  Report Given to : Amalia    11/25/2019 10:36 AM   Arely Becker

## 2019-11-25 NOTE — PLAN OF CARE
Received orders for OT evaluation. Pt is back at baseline and is being discharged today. OT evaluation is not indicated at this time.

## 2019-11-25 NOTE — PLAN OF CARE
"Most recent vitals: Blood Pressure 137/72   Pulse 74   Temperature 98.6  F (37  C) (Temporal)   Respiration 16   Height 1.803 m (5' 11\")   Weight 88.2 kg (194 lb 7.1 oz)   Oxygen Saturation 93%   Body Mass Index 27.12 kg/m      Pain Management:  denies    Orientation:  A&O x 4, PERRL, BUE strong, BLE absent from neurosarcoidosis & paraplegia, no neurological changes overnight, still has numbness to right pinky finger but denies numbness/tingling elsewhere.       Cardiac:  SR BBB 60's-70's, pulses BUE 2+, BLE 1+, edema in BLE, hypertensive 's-200's with movement and while up in chair.  's when resting in bed and sleeping     Respiratory:  LS clear equal bilaterally on RA      GI:  BS active, LBM 11/25     :  using urinal, voiding clear/yellow     Nutrition:  carb controlled diet, BG stable     Mobility: Transferred with SBA.  NEEDS BED RAILS DOWN TO TRANSFER AND BED WITHIN 5 INCHES OF BED     Safety:  Uses call light appropriately, call light within reach, requires supervision with transfers.  Pt did transfer without assistance during the shift.  Pt was educated that if he continued to transfer on his own, without supervision, that a chair alarm will be applied for his safety.  Will continue to monitor.      11/25/2019  6:10 AM  Elsa Gilliam RN    Face to face report given with opportunity to observe patient.    Report given to ALEJANDRA Holland RN   11/25/2019  0700 AM        "

## 2019-11-25 NOTE — PLAN OF CARE
Pt is independent mostly with transferring. Did need alittle help getting from bed to mri table. Pt had MRI this Am that did show stroke. Pt still HTN so increased to norvasc to 10mg daily. Pt understands to take additional 5 when they pick medication up. Pt wishes to go home and treat BP as output. Follow up made with Laxmi MARIEE as Dr. Bowling was not available. Recheck INR on Wednesday. Pt understands I left message for that appt to call prior to stopping in Wednesday. Pt has good appetite go ing to bathroom ok. No questions at this time. Went over BP control and monitoring. As well as starting a cholesterol medication.     Patient discharged at 12:30 PM via wheel chair accompanied by spouse and staff. Prescriptions sent to patients preferred pharmacy. All belongings sent with patient.     Discharge instructions reviewed with pt and spouse. Listed belongings gathered and returned to patient. All personal    Patient discharged to home.   Report called to na    Core Measures and Vaccines  Core Measures applicable during stay: Yes. If yes, state diagnosis: Stroke  Pneumonia and Influenza given prior to discharge, if indicated: N/A    Surgical Patient   Surgical Procedures during stay: none  Did patient receive discharge instruction on wound care and recognition of infection symptoms? Yes    MISC  Follow up appointment made:  Yes  Home and hospital aquired medications returned to patient: N/A  Patient reports pain was well managed at discharge: Yes. Denies need for any medication for pain. Pt has chronic pain issues but normally doesn't take anything.

## 2019-12-03 ENCOUNTER — DOCUMENTATION ONLY (OUTPATIENT)
Dept: OTHER | Facility: CLINIC | Age: 74
End: 2019-12-03

## 2019-12-12 ENCOUNTER — DOCUMENTATION ONLY (OUTPATIENT)
Dept: OTHER | Facility: CLINIC | Age: 74
End: 2019-12-12

## 2020-03-02 ENCOUNTER — HEALTH MAINTENANCE LETTER (OUTPATIENT)
Age: 75
End: 2020-03-02

## 2020-12-20 ENCOUNTER — HEALTH MAINTENANCE LETTER (OUTPATIENT)
Age: 75
End: 2020-12-20

## 2021-02-02 ENCOUNTER — IMMUNIZATION (OUTPATIENT)
Dept: FAMILY MEDICINE | Facility: OTHER | Age: 76
End: 2021-02-02
Attending: FAMILY MEDICINE
Payer: MEDICARE

## 2021-02-02 PROCEDURE — 91300 PR COVID VAC PFIZER DIL RECON 30 MCG/0.3 ML IM: CPT

## 2021-02-23 ENCOUNTER — IMMUNIZATION (OUTPATIENT)
Dept: FAMILY MEDICINE | Facility: OTHER | Age: 76
End: 2021-02-23
Attending: FAMILY MEDICINE
Payer: MEDICARE

## 2021-02-23 PROCEDURE — 91300 PR COVID VAC PFIZER DIL RECON 30 MCG/0.3 ML IM: CPT

## 2021-04-18 ENCOUNTER — HEALTH MAINTENANCE LETTER (OUTPATIENT)
Age: 76
End: 2021-04-18

## 2021-09-23 ENCOUNTER — IMMUNIZATION (OUTPATIENT)
Dept: FAMILY MEDICINE | Facility: OTHER | Age: 76
End: 2021-09-23
Attending: FAMILY MEDICINE
Payer: MEDICARE

## 2021-09-23 PROCEDURE — 0003A PR COVID VAC PFIZER DIL RECON 30 MCG/0.3 ML IM: CPT

## 2021-10-19 ENCOUNTER — TRANSFERRED RECORDS (OUTPATIENT)
Dept: HEALTH INFORMATION MANAGEMENT | Facility: CLINIC | Age: 76
End: 2021-10-19

## 2021-12-20 ENCOUNTER — TRANSFERRED RECORDS (OUTPATIENT)
Dept: HEALTH INFORMATION MANAGEMENT | Facility: CLINIC | Age: 76
End: 2021-12-20

## 2021-12-27 ENCOUNTER — MEDICAL CORRESPONDENCE (OUTPATIENT)
Dept: CT IMAGING | Facility: HOSPITAL | Age: 76
End: 2021-12-27
Payer: MEDICARE

## 2022-01-06 ENCOUNTER — HOSPITAL ENCOUNTER (OUTPATIENT)
Dept: NUCLEAR MEDICINE | Facility: HOSPITAL | Age: 77
End: 2022-01-06
Attending: UROLOGY
Payer: MEDICARE

## 2022-01-06 ENCOUNTER — HOSPITAL ENCOUNTER (OUTPATIENT)
Dept: CT IMAGING | Facility: HOSPITAL | Age: 77
End: 2022-01-06
Attending: UROLOGY
Payer: MEDICARE

## 2022-01-06 DIAGNOSIS — C61 MALIGNANT NEOPLASM OF PROSTATE (H): ICD-10-CM

## 2022-01-06 LAB
CREAT BLD-MCNC: 0.6 MG/DL (ref 0.7–1.3)
GFR SERPL CREATININE-BSD FRML MDRD: >60 ML/MIN/1.73M2

## 2022-01-06 PROCEDURE — A9503 TC99M MEDRONATE: HCPCS | Performed by: RADIOLOGY

## 2022-01-06 PROCEDURE — 250N000011 HC RX IP 250 OP 636: Performed by: RADIOLOGY

## 2022-01-06 PROCEDURE — 343N000001 HC RX 343: Performed by: RADIOLOGY

## 2022-01-06 PROCEDURE — 82565 ASSAY OF CREATININE: CPT

## 2022-01-06 PROCEDURE — 74177 CT ABD & PELVIS W/CONTRAST: CPT

## 2022-01-06 PROCEDURE — 78306 BONE IMAGING WHOLE BODY: CPT

## 2022-01-06 RX ORDER — TC 99M MEDRONATE 20 MG/10ML
20-30 INJECTION, POWDER, LYOPHILIZED, FOR SOLUTION INTRAVENOUS ONCE
Status: COMPLETED | OUTPATIENT
Start: 2022-01-06 | End: 2022-01-06

## 2022-01-06 RX ORDER — IOPAMIDOL 755 MG/ML
100 INJECTION, SOLUTION INTRAVASCULAR ONCE
Status: COMPLETED | OUTPATIENT
Start: 2022-01-06 | End: 2022-01-06

## 2022-01-06 RX ADMIN — TC 99M MEDRONATE 26.4 MCI.: 20 INJECTION, POWDER, LYOPHILIZED, FOR SOLUTION INTRAVENOUS at 10:57

## 2022-01-06 RX ADMIN — IOPAMIDOL 95 ML: 755 INJECTION, SOLUTION INTRAVENOUS at 12:26

## 2022-01-11 ENCOUNTER — TRANSFERRED RECORDS (OUTPATIENT)
Dept: HEALTH INFORMATION MANAGEMENT | Facility: CLINIC | Age: 77
End: 2022-01-11

## 2022-01-12 ENCOUNTER — MEDICAL CORRESPONDENCE (OUTPATIENT)
Dept: HEALTH INFORMATION MANAGEMENT | Facility: CLINIC | Age: 77
End: 2022-01-12

## 2022-01-18 ENCOUNTER — TELEPHONE (OUTPATIENT)
Dept: RADIATION ONCOLOGY | Facility: HOSPITAL | Age: 77
End: 2022-01-18
Payer: MEDICARE

## 2022-01-18 NOTE — TELEPHONE ENCOUNTER
Patient would like to reschedule consult appointment today with, Dr. Carson due to the weather conditions. No capabilities to do virtual visit for patient. Rescheduled for: Tuesday, January 25th, 2022, confirmed with patient and his wife.

## 2022-01-25 ENCOUNTER — ONCOLOGY VISIT (OUTPATIENT)
Dept: RADIATION ONCOLOGY | Facility: HOSPITAL | Age: 77
End: 2022-01-25
Attending: RADIOLOGY
Payer: MEDICARE

## 2022-01-25 VITALS — HEIGHT: 71 IN | BODY MASS INDEX: 25.9 KG/M2 | WEIGHT: 185 LBS

## 2022-01-25 DIAGNOSIS — C61 PROSTATE CANCER (H): Chronic | ICD-10-CM

## 2022-01-25 PROCEDURE — 99205 OFFICE O/P NEW HI 60 MIN: CPT | Performed by: RADIOLOGY

## 2022-01-25 PROCEDURE — G0463 HOSPITAL OUTPT CLINIC VISIT: HCPCS | Performed by: RADIOLOGY

## 2022-01-25 PROCEDURE — 99417 PROLNG OP E/M EACH 15 MIN: CPT | Performed by: RADIOLOGY

## 2022-01-25 ASSESSMENT — MIFFLIN-ST. JEOR: SCORE: 1581.28

## 2022-01-25 ASSESSMENT — PAIN SCALES - GENERAL: PAINLEVEL: MILD PAIN (3)

## 2022-01-25 NOTE — PROGRESS NOTES
[facility]  DEPARTMENT OF RADIATION ONCOLOGY  CONSULTATION NOTE (Video Virtual Visit)    Name: Clem Bishop MRN: [id number]   : 1945 (76 year old)  Date of Service: 2022 Referring: Dr. Cespedes       Diagnosis and Cancer Staging  Prostate cancer (H)  Staging form: Prostate, AJCC 8th Edition  - Clinical stage from 2022: Stage IIC (cT2a, cN0, cM0, PSA: 9.4, Grade Group: 4) - Signed by Ronald Carson MD on 2022      (2022 Replacement note for prior version that was not retained in the EMR).  Summary   (Please note that EMR interfaces between institutions can result in loss of embedded images).     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 In the setting of paraplegia and muscle wasting due to motor-neuron disease (neurosarcoidosis),, urology referred the patient for consultation about the role of definitive radiation therapy with androgen deprivation therapy (ADT) with potentially curative intent (Primary: 87-mL prostate with up to Jojo score 8 disease (GG4, 4+4) involving up to 40% of 12/12 cores. Nodes: N0 per CT. Metastasis: M0 per bone scan. Markers: PSA 9.38 on 10/19/2021). ADT is anticipated to start today through urology (6-months or of course therapy). 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 The patient relies on his wife for transportation. The effort required for transportation and his specialist van  has led the patient and his wife to defer radiation therapy until the spring. Baseline : Daytime x6-8, nocturia x1-2, 1 pad per day, occasional leakage, mild urgency and hesitation, requires oxybutynin for nocturia (AUA 11+2). Baseline GI: 1 soft bowel movement each morning.  2022 CT      History of Present Illness (SL)   The patient's oncologic history across multiple institutions is abstracted as follows:    PSA History:  Date PSA   2020  7.71   2020  7.88   2021  8.20   2021  8.36   10/19/2021  9.38   2021  Biopsy   2022  ADT start (pending)     Diagnosis:    2021 Urology consultation: Referred for evaluation of rising PSA. Recommended biopsy.    2021 TRUS-guided sextant prostate biopsy: Operative report described an uncomplicated procedure. Digital rectal examination noted a nodule near the left apex. Biopsy findings as above.    [place]2022 Abdomen/pelvis CT with with contrast: Enlarged prostate deeply invading the bladder base. Not clinically suspicious regional nodes or distant metastatic disease.    2022 Bone scan: Not clinically suspicious for metastatic disease. Sternal and left shoulder findings felt to be traumatic or degenerative in nature.    2022 Urology follow-up: Recommended referral for radiation therapy and discussion about the merits of adding ADT to radiation therapy versus radiation therapy alone. Family had declined ADT as monotherapy (non-curative) due to risks of worsening the patient's pre-existing muscle wasting. Among the other strategies discussed were deferral of initiation of ADT until the PSA rises significantly (i.e., greater than 20) or radical prostatectomy (not recommended due to significant comorbidities).   [place]2020 Urology follow-up: Anticipate initiation of short course ADT as an adjunct to definitive pelvic radiation therapy.    2022 Placement of interstitial perirectal hydrogel  "and prostate fiducial markers (tentative date).    We reviewed other conditions that can influence the choice of therapy and its morbidity. The patient has no prior in-depth knowledge of radiation therapy (his son is a nurse anesthetist in [place]). The patient's neurosarcoidosis with polyradiculoneuropathy and paraplegia has required a wheelchair since 2005 and been clinically stable since 2007 (neurology at [facility], Tayo Logan MD, discharged the patient from regular follow-up after his visit on 04/14/2017 due to the stability of his disease). The patient reports no sequelae following a cerebrovascular accident in 2019 (transient left facial and body numbness). The patient is morbidly obese, has type 2 diabetes mellitus, but no longer requires insulin since stopping prednisone for neurosarcoidosis. The patient has poor urinary function as reflected by the need for 1 pad per day for leakage and dependence on oxybutynin to control nocturia. However, his IPSS/AUA symptom score is 11+2 (2, 3, 2, 0, 2, 0, 2+2) with a \"mostly satisfied\" quality of life due to urinary function. He denies dysuria or hematuria. He denies pelvic or sacral pain. He denies a history of transurethral resection of the prostate (TURP). He has soft, nonuseful erections and does not use a phosphodiesterase type-5 (PDE-5) inhibitor. He has 1 soft bowel movement each morning. He does use a stool softener or laxative. He denies hemorrhoids, diarrhea, rectal pain, mucous discharge, or bright red blood per rectum. Upper and lower endoscopies on 04/19/2006 for unexplained diarrhea ([facility]) yielded pathology that were negative for malignancy. The patient declines referral for a screening colonoscopy for an occult colorectal cancer (we counseled the patient that the finding of such a cancer would represent a greater threat to his longevity than prostate cancer and that management of a colorectal cancer would impact management of his prostate cancer " and vice versa). He denies a history of erosive or ulcerative diseases of the stomach and small bowel. He denies the use of common medications and complementary interventions that can lower PSA. He denies contraindications to radiation therapy such as a history of Paget's disease of the bone, fibrous dysplasia, Crohn's disease, ulcerative colitis, and other chronic gastrointestinal complaints. He denies hip replacement, hip surgery, avascular necrosis, or significant osteoarthritis. He denies a history of radiation exposure, chemotherapy, or collagen vascular diseases. We counseled the patient about COVID-19 risks, precautions, and potential impact on his radiation therapy. He denied recent known exposure to COVID-19, risky behaviors, or related symptoms including febrile, chest, gustatory, gastrointestinal, and systemic complaints.    Chemotherapy History: None.  Radiation History: Pending.  Implanted Cardiac Devices: None.  Implanted Chest Wall Infusion Port: None.    Past Medical History:   Diagnosis Date     Actinic keratosis      Broken arm     patient thinks it was his right arm.     Cerebrovascular accident (H) 11/23/2019    Acute left thalamaic, facial numbness, slurred speech     Chronic deep vein thrombosis (DVT) of left lower extremity, unspecified vein (H)      Diabetes mellitus without complication (H)      Elevated prostate specific antigen (PSA)     less than 10ng/ml     History of basal cell carcinoma      Hypertension      Hypertension, essential      Long term current use of anticoagulant therapy      Mixed hyperlipidemia      Neurosarcoidosis 2005    Paraplegia since 2005     Paraplegia (H) 2005     Personal history of venous thrombosis and embolism     on warfarin     Prostate cancer (H)      Past Surgical History:   Procedure Laterality Date     COLONOSCOPY - HIM SCAN  04/19/2006    Colonoscopy at the [facility] 4/16/2006     EGD  04/20/2006     EXCISE LESION EAR EXTERNAL Left 06/23/2016     Procedure: EXCISE LESION EAR EXTERNAL;  Surgeon: Von Suresh DO;  Location: [facility] OR     FLEXIBLE SIGMOIDOSCOPY - HIM SCAN  03/24/2000    Flex Sigmoidoscopy - Hyperplastic polyp     SPINAL PUNCTURE,LUMBAR, DIAGNOSTIC  07/07/2005    diaphragm punctured     Outpatient Encounter Medications as of 1/25/2022   Medication Sig Dispense Refill     amLODIPine (NORVASC) 10 MG tablet Take 1 tablet (10 mg) by mouth daily 30 tablet 0     aspirin (ASA) 81 MG EC tablet Take 1 tablet (81 mg) by mouth daily 30 tablet 0     atenolol (TENORMIN) 50 MG tablet Take 50 mg by mouth daily       Calcium Carbonate (CALCIUM 600 PO) Take 1 tablet by mouth daily        glyBURIDE (DIABETA /MICRONASE) 5 MG tablet Take 10 mg by mouth 2 times daily (with meals)        ibuprofen (ADVIL,MOTRIN) 600 MG tablet Take 1 tablet (600 mg) by mouth every 6 hours as needed for pain 30 tablet 0     JANTOVEN ANTICOAGULANT 1 MG tablet 1mg 4 times per week, 1.5mg 3 times per week.  Every 6 week blood drawn.       losartan (COZAAR) 100 MG tablet Take 100 mg by mouth daily (half tab daily)       METFORMIN HCL ER PO Take 1,000 mg by mouth 2 times daily       omeprazole (PRILOSEC) 20 MG DR capsule Take 40 mg by mouth every morning        ONETOUCH ULTRA test strip        oxybutynin (DITROPAN-XL) 5 MG 24 hr tablet Take 5 mg by mouth daily        simvastatin (ZOCOR) 10 MG tablet Take 1 tablet (10 mg) by mouth At Bedtime 30 tablet 0     triamcinolone (KENALOG) 0.1 % external cream        No facility-administered encounter medications on file as of 1/25/2022.     Allergies/Reactions: Seasonal allergies    Orders via Epic EMR: No orders of the defined types were placed in this encounter.    Social History     Social History Narrative     with three children, wife is caregive. 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    Hobbies: Use to carve wood, use to draw, paint, used to do rock work & carpentry, able to carve walking sticks at times, hard with limited  "right hand function.     Socioeconomic History     Marital status:      Spouse name: Myranda     Number of children: 3     Highest education level: High school graduate   Occupational History     Occupation: Disabled:   Blaster     Employer: [employer]     Social History     Tobacco Use     Smoking status: Never Smoker     Smokeless tobacco: Never Used   Vaping Use     Vaping Use: Never used   Substance Use Topics     Alcohol use: Not Currently     Comment: once in a while has a glass of wine with wife     Drug use: Never     Family History   Problem Relation Age of Onset     Hypertension Mother      Cerebrovascular Disease Father      Emphysema Father      Breast Cancer Sister      Fibromyalgia Sister      Cerebrovascular Disease Sister      Brain Cancer Sister      Coronary Artery Disease Sister      Prostate Cancer Brother      Lupus Brother      Coronary Artery Disease Son    No  other cancers in first or second degree relatives.    Baseline Review of Systems (prior to radiation therapy; supplemental to the History)   ROS (score of 0 indicates that symptom is at baseline for most sections below): See Flowsheet for additional comments as indicated.  Mild Pain (3) due to chronic right shoulder pain  [unfilled]                               Patient Health Questionnaire-9 (PHQ-9)   No flowsheet data found.    Physical Exam   KPS: 60 (requires occasional assistance but cares for most personal needs).  ECOG Status: 3 (Capable of only limited self-care; needs help with ADLs; in bed/chair >50% of waking hours)  Vitals: Ht 1.803 m (5' 11\")   Wt 83.9 kg (185 lb)   BMI 25.80 kg/m    Wt Readings from Last 3 Encounters:   01/25/22 83.9 kg (185 lb)   01/17/22 83.9 kg (185 lb)   11/24/19 88.2 kg (194 lb 7.1 oz)     Clinical Examination (patient and LPN assisted virtual examination):  General: Appears clinically/medically stable. Appears in fair general health. Overweight (BMI 25-29). Appears slightly fatigued. " Appears stated age. Appears well-developed, well-nourished, and in no acute distress. Does not appear acutely or chronically ill. Appears well groomed.  Head: Natural alopecia pattern. Normocephalic.  Eyes: Glasses. Anicteric, vision intact.  ENT: Lips normal colored. Good volume. No hoarseness.  Neck: Symmetric, trachea midline.  Lymphatics: Deferred.  Chest/Breasts: No port. No implanted cardiac device.   Lungs: Easy respirations, no use of accessory muscles.  Cardiovascular: No jugulovenous distension. No carotid pulsations.  Abdomen: Obesely distended. Otherwise nondistended.  Pelvis: Obesely distended. Otherwise nondistended.  MSK: Wheelchair. Paraplegia.  Integument: No jaundice or pallor. No cellulitis.  Neurologic: Right-handed. Alert, oriented, fluent. Memory intact. Paraplegia with right-sided motor deficits (worsens distally).  Psychologic: Well-spoken about his cancer and therapy. Good dynamic with his wife. Motivated, upbeat, relaxed, confident, and engaging. Pleasant, cooperative, insightful, and concrete.    Physician Time on Day of Encounter, and MDM Data Reviewed and Analyzed on Day of Encounter   Time spent on patient activities is based on Chart review, Visit, and Documentation. Total time: 141 minutes.    MDM based on:       High risk element:  o High-risk prostate cancer. Neurosarcoidosis requiring wheelchair. Paraplegia. Hemiparesis. Pending androgen deprivation therapy. Pending pelvic radiation therapy.       Tests, documents, or independent historian(s) analyses include but are not limited to:  o Those referenced above in the HPI.       Independent Interpretations of tests include but are not limited to:  o Those referenced above in the HPI.       Discussion with the medical team about management or test interpretation include but are not limited to:  o With urology, reviewed coordination care including initiation of ADT, scheduling of insertion of interstitial perirectal hydrogel/prostate  fiducial markers, and sequencing with radiation therapy.    Physician Information Review   Video telemedicine visit from my remove office to our clinic with the patient and his wife via [email or web address] at the patient's preference (9:35-10:07 AM). I reviewed the case with the patient and his wife, evaluated him, and discussed his treatment options and risks of therapy. I reviewed the medical records, radiographic reports, and pathologic studies. I reviewed the imaging studies via PACS and with the patient and his wife. I reviewed our intake sheets. I reviewed the case with urology. The patient and his wife are good historians, began the visit with good insights into the disease process, and acknowledged the potentially poor consequences of his disease. They educated themselves through a variety of educational media including the Internet. They demonstrated good comprehension of our discussion and explored the treatment options with good understanding. They asked pertinent questions and insightful follow-up questions. I illustrated the basic anatomy and field of treatment. We discussed the onsite and telemedicine coverage of our clinic. I offered to speak with his family members and friends today by speakerphone. The patient declined my offer but granted me permission to speak with them if they contact me or come to clinic. They declined referral for an additional opinion or conservative care. They accepted referral to our social work staff for additional resources including potential counseling. The patient granted me permission to exchange medical information and records with other healthcare professionals. No therapeutic radiation protocols for the patient's disease are available at our Center. We reviewed educational materials including Internet resources and provided the patient with written materials.    We discussed the diagnosis and definition of high-risk prostate cancer, natural history of spread,  "patterns of failure after various treatments, and fair prognosis with early treatment. We other factors specific to his disease such as his neurologic disease, muscle wasting, urinary dysfunction, erectile dysfunction, bowel function, personal preferences, and his desire to proceed at this time with potentially curative treatment over expectant management or no treatment. We discussed the limitations of radiographic studies in identifying local, regional, and distant disease. We reviewed the concept of multimodality care (radiation therapy, surgery, systemic therapy), its evolution in the treatment of prostate cancer, and the relative contribution of each modality to the treatment paradigm. We discussed the potentially beneficial role of pelvic radiation therapy in the context of the evolving standards of care and national guidelines such as the NCCN, ACR, and MAGGY including ones that address management during the COVID-19 pandemic. We reviewed the indications for definitive radiation therapy to the prostate region, elective treatment of regional pelvic nodes, and androgen deprivation therapy (ADT). We spoke about ongoing controversies in the selection of treatment options including the significance of age, toxicity profile, regional preferences, second malignancy, etc. the patient is clinically felt to be a suboptimal candidate for surgery due to his comorbidities. The patient stated a preference for external beam radiation therapy over brachytherapy or surgery.     We reviewed the concepts of CT-based simulation, computerized treatment planning (Livemap), and linac-based pelvic radiation therapy (Livemap TrueBeam linac). To provide an appropriate distribution of dose, we would use volumetric modulated arc therapy (VMAT) RapidArc radiation therapy. VMAT would include \"dose painting\" for a simultaneous integrated boost (SIB) so that sites of known disease (e.g., prostate) receive a higher daily dose then potential " areas of microscopic disease (e.g. pelvic nodes). These techniques permit good coverage of complex target tissues while maintaining adequate sparing of adjacent normal critical structures. I would not use protons, TomoTherapy, or radioprotectant agents. I do not feel that these methods offer a clear benefit in the absence of complicating anatomical geometry or comorbid conditions. The patient appears to have no absolute contraindications to radiation therapy.    We discussed in detail the relative risks, benefits, rationale, potential side effects, alternatives, and adjuncts to radiation therapy for prostate cancer with or without lemuel irradiation and ADT. The side effects may be acute or chronic,. They can be progressive, painful, and negatively impact the patient's long-term quality of life. They can require medical or surgical intervention. I anticipate at least a mild degree of acute toxicity. The toxicities include but are not limited to infection, pain, compromises of the urinary tract, gastrointestinal tract, mesentery, lymphatic system, marrow, sacral plexus, and other organs as well as systemic toxicities such as fatigue and long-term risks such as second malignancy. His pre-existing are among the factors that can significantly increase the risk and impact of toxicity. The risks of androgen deprivation therapy include but are not limited to hot flashes, libido, erectile dysfunction, gynecomastia, fatigue, weight gain, loss of bone mass, and loss of muscle mass. We counseled the patient about the potential oncologic and general health benefits of improved diet and exercise.    Physician Radiation Therapy Assessment    In summary, I concur with the recommendation of external beam radiation therapy to the pelvis as potentially curative treatment for this patient's high-risk prostate cancer in the setting of significant neurologic comorbidities (neurosarcoidosis) with resulting muscle wasting, paraplegia, and  hemiparesis. The patient is a suboptimal candidate for surgery due to his numerous comorbidities. He is not a candidate for brachytherapy due to his high [place] score and large prostate volume. Although the patient has muscle wasting due to his lower motor neuron disease, I feel that short-term androgen deprivation therapy (ADT) is important adjunct to radiation therapy to achieve a potentially curative outcome for his prostate cancer. Muscle wasting would be anticipated to be limited if ADT is given for only 6 months (i.e. short-term ADT). To assist with the delivery of radiation therapy, urology agreed to consider interstitial placement of perirectal hydrogel to reduce the risk of rectal toxicity (increased due to diabetes mellitus, etc.) and interstitial placement of prostate fiducial markers to assist with delivery of image-guided radiation therapy. Patient is wife wish to proceed as recommended by the multidisciplinary urologic oncology team. We answered all questions to their satisfaction. Thanks allowing us to participate in the care of this very pleasant patient.    Physician Radiation Therapy Plan   1) Androgen deprivation therapy (ADT): Urology graciously agreed to see the patient today to discuss ADT. Anticipate initiation of ADT in preparation for definitive radiation therapy. Anticipated total duration of 6-months of ADT.  2) Perirectal hydrogel and prostate fiducial markers: Despite the patient's comorbidities, the patient is felt to be a candidate for transperineal interstitial insertion of these aids for the delivery of radiation therapy. Tentatively scheduled for 03/17/2022.  3) Radiation therapy simulation: Based on date of the start of ADT and insertion of perirectal hydrogel/prostate markers, we will schedule CT-based simulation approximately 1-2 weeks after placement.  4) Radiation therapy treatment: Anticipate moderate hypofractionation with SIB for treatment of the prostate region and  elective treatment of pelvic nodes in 20-28 fractions.  5) COVID-19: Discussed the potential impact of the pandemic on his treatment and our ability to deliver therapy. Discussed current strategies and approaches currently being used in our Department to treat positive, negative, and suspected patients regarding COVID-19.      Ronald Carson MD, PhD  Department of Radiation Oncology  Tel: [phone]  Fax: [phone]    Care Team:  Marysol Cespedes M.D. (urology)  Tayo Logan M.D. (neurology)  Anoop Bowling M.D. (medicine)

## 2022-01-26 ENCOUNTER — TELEPHONE (OUTPATIENT)
Dept: RADIATION ONCOLOGY | Facility: HOSPITAL | Age: 77
End: 2022-01-26
Payer: MEDICARE

## 2022-01-26 NOTE — TELEPHONE ENCOUNTER
Called patient regarding scheduling a simulation appointment date and time. Patient confirmed appointment date for 4/4/22

## 2022-02-01 ENCOUNTER — TELEPHONE (OUTPATIENT)
Dept: RADIATION ONCOLOGY | Facility: HOSPITAL | Age: 77
End: 2022-02-01
Payer: MEDICARE

## 2022-02-01 NOTE — TELEPHONE ENCOUNTER
Spoke with patient and wife regarding rescheduling radiation therapy simulation for an earlier date. Simulation was rescheduled for March 24, 2022.

## 2022-02-25 ENCOUNTER — CARE COORDINATION (OUTPATIENT)
Dept: ONCOLOGY | Facility: OTHER | Age: 77
End: 2022-02-25
Payer: MEDICARE

## 2022-02-25 NOTE — PROGRESS NOTES
Care Coordination Note:    This SW reached out to patient and his wife regarding potential transportation concerns and insurance coverage. SW discussed with patient's wife about the deductible and out-of-pocket costs that the [place] insurance meant and what is the most they would be paying out of pocket for the year, if medicare didn't cover the cost of a treatment. Spouse was very thankful about the information.  In regards to the transportation options, SW advised spouse to reach out the their insurance providers and see if it is something they get covered by through there, and if not we can look into [place] or prices on different transportation options for the patient.  Spouse acknowledged her understanding and was thankful for the guidance.    JAMEL Vergara

## 2022-03-24 ENCOUNTER — ALLIED HEALTH/NURSE VISIT (OUTPATIENT)
Dept: RADIATION ONCOLOGY | Facility: HOSPITAL | Age: 77
End: 2022-03-24
Attending: RADIOLOGY
Payer: MEDICARE

## 2022-03-24 DIAGNOSIS — C61 PROSTATE CANCER (H): Primary | Chronic | ICD-10-CM

## 2022-03-24 PROCEDURE — 77334 RADIATION TREATMENT AID(S): CPT | Mod: 26 | Performed by: RADIOLOGY

## 2022-03-24 PROCEDURE — 77334 RADIATION TREATMENT AID(S): CPT | Performed by: RADIOLOGY

## 2022-03-24 PROCEDURE — 77263 THER RADIOLOGY TX PLNG CPLX: CPT | Performed by: RADIOLOGY

## 2022-03-24 RX ORDER — IBUPROFEN 200 MG
CAPSULE ORAL 2 TIMES DAILY
COMMUNITY

## 2022-03-24 NOTE — PROGRESS NOTES
[facility]  DEPARTMENT OF RADIATION ONCOLOGY   SIMULATION NOTE    Name: Clem Bishop MRN: [id number]   : 1945 (76 year old)  Date of Service: Mar 24, 2022        Diagnosis and Cancer Staging  Prostate cancer (H)  Staging form: Prostate, AJCC 8th Edition  - Clinical stage from 2022: Stage IIC (cT2a, cN0, cM0, PSA: 9.4, Grade Group: 4) - Signed by Ronald Carson MD on 2022       Procedure   For non-SBRT treatment, the patient comes to clinic for simulation and radiation therapy for treatment as specified on the written consent (site of treatment, type of cancer). Therapy, Treatment Planning, and I reviewed the anticipated radiation therapy, clinical history and documentation, and radiographic information and images. We obtained written consent for treatment. With the patient, we verified their identification, site, and side of treatment. We evaluated multiple setup positions and elected to simulate the patient in a modified supine position. We used orthogonal lasers to align them with the CT simulator. We immobilized the patient with a customized indexed torso mold and accessories to improve the reproducibility and safety for daily radiation therapy. We placed radiopaque markers to assist in identifying topographical landmarks for simulation. We obtained  and axial CT imaging through the target region. We used virtual simulation techniques to verify the adequacy of the CT images and to create a preliminary setup isocenter. Motion management was not utilized. We placed tattoos to kori the setup isocenter. The patient tolerated the procedure well and without complications. We will use available diagnostic and radiation therapy imaging studies for CT-based treatment planning. I anticipate utilizing a form of intensity-modulated or 3D-conformal radiation therapy to develop a computerized treatment plan whose dosimetric analysis (e.g., dose-volume histogram (DVH)) indicates  adequate coverage of target tissues and sparing of nearby normal structures. We will complete routine QA procedures. The patient wished to proceed as recommended. We answered all questions to his satisfaction.      Silverio Madrid MD  Department of Radiation Oncology  Tel: [phone]  Fax: [phone]

## 2022-03-29 ENCOUNTER — TELEPHONE (OUTPATIENT)
Dept: RADIATION ONCOLOGY | Facility: HOSPITAL | Age: 77
End: 2022-03-29
Payer: MEDICARE

## 2022-03-29 PROCEDURE — 77301 RADIOTHERAPY DOSE PLAN IMRT: CPT | Mod: 26 | Performed by: RADIOLOGY

## 2022-03-29 PROCEDURE — 77338 DESIGN MLC DEVICE FOR IMRT: CPT | Performed by: RADIOLOGY

## 2022-03-29 PROCEDURE — 77300 RADIATION THERAPY DOSE PLAN: CPT | Performed by: RADIOLOGY

## 2022-03-29 PROCEDURE — 77300 RADIATION THERAPY DOSE PLAN: CPT | Mod: 26 | Performed by: RADIOLOGY

## 2022-03-29 PROCEDURE — 77338 DESIGN MLC DEVICE FOR IMRT: CPT | Mod: 26 | Performed by: RADIOLOGY

## 2022-03-29 PROCEDURE — 77301 RADIOTHERAPY DOSE PLAN IMRT: CPT | Performed by: RADIOLOGY

## 2022-03-29 NOTE — TELEPHONE ENCOUNTER
Spoke with patient to schedule appts to start Monday 4/4/22 at 1:30pm.  Patient pleased with offered tx time of 1:30pm for all treatments; disappointed to hear of 20 total tx's vs the 5 tx's he was hoping for. I gave a brief explanation about the rationale around treatment planning and doses, and explained it would be Mon-Fri appointments for 4 weeks.  He had no further questions. Pt is a very pleasant gentleman.    John RESENDIZTShelly)

## 2022-04-04 ENCOUNTER — APPOINTMENT (OUTPATIENT)
Dept: RADIATION ONCOLOGY | Facility: HOSPITAL | Age: 77
End: 2022-04-04
Attending: RADIOLOGY
Payer: MEDICARE

## 2022-04-04 ENCOUNTER — RESULTS ONLY (OUTPATIENT)
Dept: RADIATION ONCOLOGY | Facility: HOSPITAL | Age: 77
End: 2022-04-04
Payer: MEDICARE

## 2022-04-04 LAB
RAD ONC ARIA COURSE ID: NORMAL
RAD ONC ARIA COURSE LAST TREATMENT DATE: NORMAL
RAD ONC ARIA COURSE START DATE: NORMAL
RAD ONC ARIA COURSE TREATMENT ELAPSED DAYS: 0
RAD ONC ARIA FIRST TREATMENT DATE: NORMAL
RAD ONC ARIA PLAN FRACTIONS TREATED TO DATE: 1
RAD ONC ARIA PLAN ID: NORMAL
RAD ONC ARIA PLAN NAME: NORMAL
RAD ONC ARIA PLAN PRESCRIBED DOSE PER FRACTION: 3 GY
RAD ONC ARIA PLAN TOTAL FRACTIONS PRESCRIBED: 20
RAD ONC ARIA PLAN TOTAL PRESCRIBED DOSE: 6000 CGY
RAD ONC ARIA REFERENCE POINT DOSAGE GIVEN TO DATE: NORMAL GY
RAD ONC ARIA REFERENCE POINT ID: NORMAL

## 2022-04-04 PROCEDURE — 77385 HC IMRT TREATMENT DELIVERY, SIMPLE: CPT | Performed by: RADIOLOGY

## 2022-04-05 ENCOUNTER — RESULTS ONLY (OUTPATIENT)
Dept: RADIATION ONCOLOGY | Facility: HOSPITAL | Age: 77
End: 2022-04-05
Payer: MEDICARE

## 2022-04-05 ENCOUNTER — APPOINTMENT (OUTPATIENT)
Dept: RADIATION ONCOLOGY | Facility: HOSPITAL | Age: 77
End: 2022-04-05
Payer: MEDICARE

## 2022-04-05 LAB
RAD ONC ARIA COURSE ID: NORMAL
RAD ONC ARIA COURSE LAST TREATMENT DATE: NORMAL
RAD ONC ARIA COURSE START DATE: NORMAL
RAD ONC ARIA COURSE TREATMENT ELAPSED DAYS: 1
RAD ONC ARIA FIRST TREATMENT DATE: NORMAL
RAD ONC ARIA PLAN FRACTIONS TREATED TO DATE: 2
RAD ONC ARIA PLAN ID: NORMAL
RAD ONC ARIA PLAN NAME: NORMAL
RAD ONC ARIA PLAN PRESCRIBED DOSE PER FRACTION: 3 GY
RAD ONC ARIA PLAN TOTAL FRACTIONS PRESCRIBED: 20
RAD ONC ARIA PLAN TOTAL PRESCRIBED DOSE: 6000 CGY
RAD ONC ARIA REFERENCE POINT DOSAGE GIVEN TO DATE: NORMAL GY
RAD ONC ARIA REFERENCE POINT ID: NORMAL

## 2022-04-05 PROCEDURE — 77385 HC IMRT TREATMENT DELIVERY, SIMPLE: CPT | Performed by: RADIOLOGY

## 2022-04-05 NOTE — PROGRESS NOTES
[facility]  DEPARTMENT OF RADIATION ONCOLOGY  ON TREATMENT VISIT (OTV) NOTE    Name: Clem Bishop MRN: [id number]   : 1945 (76 year old)  Date of Service: 2022 Referring: Dr. Cespedes       Diagnosis and Cancer Staging  Prostate cancer (H)  Staging form: Prostate, AJCC 8th Edition  - Clinical stage from 2022: Stage IIC (cT2a, cN0, cM0, PSA: 9.4, Grade Group: 4) - Signed by Ronald Carson MD on 2022      Radiation Therapy   Site: Prostate and pelvic nodes using volumetric-modulated arc therapy (VMAT) with simultaneous integrated boost (SIB) and androgen deprivation therapy (ADT).   Treatment to Date    Received 3 fraction(s) = 300 cGy (at 300, 220 each)    Remaining 17 fraction(s)    Goal 20 fractions = 6000 cGy (6000, 4400)     Radiation therapy week 0 (fractions 1-5): Grade 0 toxicity due to radiation therapy (CTCAE 5.0).  Week 1 (-10):   Week 2 (11-15):   Week 3 (16-20):     Summary   (Please note that EMR interfaces between institutions can result in loss of embedded images).     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 Nodes: N0 per CT. Metastasis: M0 per bone scan. Markers: PSA 9.38 on 10/19/2021). ADT started 2022  through urology (6-months course of therapy). 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 The patient relies on his wife for transportation. The effort required for transportation and his specialist van led the patient and his wife to defer radiation therapy until the spring. Baseline : Daytime x6-8, nocturia x1-2, 1 pad per day, occasional leakage, mild urgency and hesitation, requires oxybutynin for nocturia (AUA 11+2). Baseline GI: 1 soft bowel movement each morning.  01/06/2022 CT      Recent History (SL)   General: Seem daily at linac. Seen in clinic with his wife. Reports that it feels a has no toxicity from radiation therapy. Feels relatively well spite his chronic illnesses. Offers no new complaints. Finds setup and treatment to be easier than he had anticipated. Dara lift for transfer from his wheelchair to the linac couch. No bowel or bladder preparation prior to daily radiation therapy. Counseled that occasional missed treatment will not compromise the efficacy of therapy and will be made up at the end of the treatment schedule.  :  Stable at baseline as above.  Index: Grade 0.  Intervention: Observe.   Impression: Tolerating set up well. Has elevated risk of mild to morbid urinary toxicity due to baseline dysfunction, prostate size, bladder wall abnormalities, general health, and functional status.  GI: Stable at baseline as above.  Index: Grade 0.  Intervention: Observe.  Impression: Possible changes within 1-2 weeks.  Fatigue: Stable at compromised baseline.  Index: Grade 0 due to radiation therapy.  Intervention: Observe.  Impression: Progressive changes possible within 1-2 weeks.  ID: Daily COVID-19 screening negative for barriers to proceeding with radiation therapy.  Follow-up: After radiation therapy, refer back to urology for appropriate  evaluation of additional ADT (currently, short-term ADT anticipated), PSA measurements, and testosterone levels as indicated to assist interpretation of PSA levels for post radiation diagnostic purposes    Prior Oncologic History   The patient's oncologic history across multiple institutions is abstracted as follows:    PSA History:  Date PSA   2020  7.71   2020  7.88   2021  8.20   2021  8.36   10/19/2021  9.38   2021  Biopsy   2022  ADT start     Diagnosis:    2021 Urology consultation: Referred for evaluation of rising PSA. Recommended biopsy.    2021 TRUS-guided sextant prostate biopsy: Operative report described an uncomplicated procedure. Digital rectal examination noted a nodule near the left apex. Biopsy findings as above.    [place]2022 Abdomen/pelvis CT with with contrast: Enlarged prostate deeply invading the bladder base. Not clinically suspicious regional nodes or distant metastatic disease.    2022 Bone scan: Not clinically suspicious for metastatic disease. Sternal and left shoulder findings felt to be traumatic or degenerative in nature.    2022 Urology follow-up: Recommended referral for radiation therapy and discussion about the merits of adding ADT to radiation therapy versus radiation therapy alone. Family had declined ADT as monotherapy (non-curative) due to risks of worsening the patient's pre-existing muscle wasting. Among the other strategies discussed were deferral of initiation of ADT until the PSA rises significantly (i.e., greater than 20) or radical prostatectomy (not recommended due to significant comorbidities).    2020 Start of androgen deprivation therapy (ADT): Short-course of ADT as an adjunct to definitive pelvic radiation therapy.    2022 Placement of interstitial perirectal hydrogel and prostate fiducial markers (tentative date).    Chemotherapy History: None.  Radiation History: Yes (as  above).  Implanted Cardiac Devices: None.  Implanted Chest Wall Infusion Port: None.    Past Medical History:   Diagnosis Date     Actinic keratosis      Broken arm     patient thinks it was his right arm.     Cerebrovascular accident (H) 11/23/2019    Acute left thalamaic, facial numbness, slurred speech     Chronic deep vein thrombosis (DVT) of left lower extremity, unspecified vein (H)      Diabetes mellitus without complication (H)      Elevated prostate specific antigen (PSA)     less than 10ng/ml     History of basal cell carcinoma      Hypertension      Hypertension, essential      Long term current use of anticoagulant therapy      Mixed hyperlipidemia      Neurosarcoidosis 2005    Paraplegia since 2005     Paraplegia (H) 2005     Personal history of venous thrombosis and embolism     on warfarin     Prostate cancer (H)      Past Surgical History:   Procedure Laterality Date     COLONOSCOPY - HIM SCAN  04/19/2006    Colonoscopy at the [facility] 4/16/2006     EGD  04/20/2006     EXCISE LESION EAR EXTERNAL Left 06/23/2016    Procedure: EXCISE LESION EAR EXTERNAL;  Surgeon: Von Suresh DO;  Location: [facility] OR     FLEXIBLE SIGMOIDOSCOPY - HIM SCAN  03/24/2000    Flex Sigmoidoscopy - Hyperplastic polyp     SPINAL PUNCTURE,LUMBAR, DIAGNOSTIC  07/07/2005    diaphragm punctured     Outpatient Encounter Medications as of 4/6/2022   Medication Sig Dispense Refill     amLODIPine (NORVASC) 10 MG tablet Take 1 tablet (10 mg) by mouth daily 30 tablet 0     aspirin (ASA) 81 MG EC tablet Take 1 tablet (81 mg) by mouth daily 30 tablet 0     calcium carbonate 500 mg, elemental, (OSCAL 500) 1250 (500 Ca) MG TABS tablet Take by mouth 2 times daily 250 mg BID       glyBURIDE (DIABETA /MICRONASE) 5 MG tablet Take 10 mg by mouth 2 times daily (with meals)        JANTOVEN ANTICOAGULANT 1 MG tablet 1mg 4 times per week, 1.5mg 3 times per week.  Every 6 week blood drawn.       losartan (COZAAR) 100 MG tablet Take 100 mg by mouth  daily        METFORMIN HCL ER PO Take 1,000 mg by mouth 2 times daily       omeprazole (PRILOSEC) 20 MG DR capsule Take 40 mg by mouth every morning        ONETOUCH ULTRA test strip daily        oxybutynin (DITROPAN-XL) 5 MG 24 hr tablet Take 5 mg by mouth daily        simvastatin (ZOCOR) 10 MG tablet Take 1 tablet (10 mg) by mouth At Bedtime 30 tablet 0     triamcinolone (KENALOG) 0.1 % external cream        atenolol (TENORMIN) 50 MG tablet Take 50 mg by mouth daily       ibuprofen (ADVIL,MOTRIN) 600 MG tablet Take 1 tablet (600 mg) by mouth every 6 hours as needed for pain (Patient not taking: Reported on 4/6/2022) 30 tablet 0     No facility-administered encounter medications on file as of 4/6/2022.     Allergies/Reactions: Seasonal allergies    Orders via Epic EMR: No orders of the defined types were placed in this encounter.    Social History     Social History Narrative     with three children, wife is caregive. 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    Hobbies: Use to carve wood, use to draw, paint, used to do rock work & carpentry, able to carve walking sticks at times, hard with limited right hand function.     Socioeconomic History     Marital status:      Spouse name: Myranda     Number of children: 3     Highest education level: High school graduate   Occupational History     Occupation: Disabled:   Blaster     Employer: [employer]     Social History     Tobacco Use     Smoking status: Never Smoker     Smokeless tobacco: Never Used   Vaping Use     Vaping Use: Never used   Substance Use Topics     Alcohol use: Not Currently     Comment: once in a while has a glass of wine with wife     Drug use: Never     Family History   Problem Relation Age of Onset     Hypertension Mother      Cerebrovascular Disease Father      Emphysema Father      Breast Cancer Sister      Fibromyalgia Sister      Cerebrovascular Disease Sister      Brain Cancer Sister      Coronary Artery Disease  Sister      Prostate Cancer Brother      Lupus Brother      Coronary Artery Disease Son    No  other cancers in first or second degree relatives.    Baseline Review of Systems (prior to radiation therapy; supplemental to the History)   ROS (score of 0 indicates that symptom is at baseline for most sections below): See Flowsheet for additional comments as indicated.  Moderate Pain (4) due to chronic right shoulder pain    Patient Health Questionnaire-9 (PHQ-9)   No flowsheet data found.    Physical Exam   KPS: 60 (requires occasional assistance but cares for most personal needs).  ECOG Status: 3 (Capable of only limited self-care; needs help with ADLs; in bed/chair >50% of waking hours)  Vitals: There were no vitals taken for this visit.  Wt Readings from Last 3 Encounters:   01/25/22 83.9 kg (185 lb)   01/17/22 83.9 kg (185 lb)   11/24/19 88.2 kg (194 lb 7.1 oz)     Clinical Examination (during the week, additional physician evaluations at linac with the staff)   General: Appears clinically/medically stable. Appears in fair general health. Overweight (BMI 25-29). Appears slightly fatigued. Appears stated age. Appears well-developed, well-nourished, and in no acute distress. Does not appear acutely or chronically ill. Appears well groomed.  Head: Natural alopecia pattern. Normocephalic.  Eyes: Glasses. Anicteric, vision intact.  ENT: Lips normal colored. Good volume. No hoarseness.  Neck: Symmetric, trachea midline.  Lymphatics: No cervical or supraclavicular adenopathy.  Chest/Breasts: No port. No implanted cardiac device.   Lungs: Easy respirations, no use of accessory muscles. Clear to auscultation.  Cardiovascular: No jugulovenous distension. No carotid pulsations. RRR, S1, S2.  Abdomen: No erythema. Obesely distended. Otherwise, nondistended and nontender. Soft. Bowel sounds positive.  Pelvis: No erythema. Obesely distended. Otherwise, nondistended and nontender. Soft. Bowel sounds positive.  MSK: Wheelchair.  Paraplegia. Thin calf muscles with good muscle tone. Good posture.  Integument: No jaundice or pallor.  Neurologic: Right-handed. Alert, oriented, fluent. Memory intact. Paraplegia with right-sided motor deficits (worsens distally).  Psychologic: Well-spoken about his cancer and therapy. Good dynamic with his wife. Motivated, upbeat, relaxed, confident, and engaging. Pleasant, cooperative, insightful, and concrete.    Physician Time on Day of Encounter, and [facility] Data Reviewed and Analyzed on Day of Encounter   Not applicable.    Physician Radiation Therapy Information Review   Radiation Oncology weekly review: Reviewed available labs and diagnostic studies. Interpreted as adequate to proceed with radiation therapy. Discussed management with Therapy, Treatment Planning, and Nursing. Review or weekly routine QA, linac imaging, and clinical set up are adequate to proceed with radiation therapy as prescribed.    Physician Radiation Therapy Assessment   Routine tolerance to radiation therapy.    Physician Radiation Therapy Plan   No new radiation therapy interventions. No separate boost/cone down phase since the plan utilizes a simultaneous integrated boost (SIB). Reviewed the graphical 3D plan with the patient and his wife with attention to dose to the prostate region, regional nodes, rectum, bladder, small bowel bowel, large bowel, hips, etc. Reviewed anticipated time course, nature, and potential interventions for managing toxicity during and after radiation therapy. Patient aware of onsite and telemedicine coverage of our clinic. He wished to proceed with treatment. All questions answered to the satisfaction of the patient and his wife.      Ronald Carson MD, PhD  Department of Radiation Oncology  Tel: [phone]  Fax: [phone]    Care Team:  Marysol Cespedes M.D. (urology)  Tayo Logan M.D. (neurology)  Anoop Bowling M.D. (medicine)

## 2022-04-06 ENCOUNTER — RESULTS ONLY (OUTPATIENT)
Dept: RADIATION ONCOLOGY | Facility: HOSPITAL | Age: 77
End: 2022-04-06
Payer: MEDICARE

## 2022-04-06 ENCOUNTER — OFFICE VISIT (OUTPATIENT)
Dept: RADIATION ONCOLOGY | Facility: HOSPITAL | Age: 77
End: 2022-04-06
Attending: RADIOLOGY
Payer: MEDICARE

## 2022-04-06 DIAGNOSIS — C61 PROSTATE CANCER (H): Primary | Chronic | ICD-10-CM

## 2022-04-06 LAB
RAD ONC ARIA COURSE ID: NORMAL
RAD ONC ARIA COURSE LAST TREATMENT DATE: NORMAL
RAD ONC ARIA COURSE START DATE: NORMAL
RAD ONC ARIA COURSE TREATMENT ELAPSED DAYS: 2
RAD ONC ARIA FIRST TREATMENT DATE: NORMAL
RAD ONC ARIA PLAN FRACTIONS TREATED TO DATE: 3
RAD ONC ARIA PLAN ID: NORMAL
RAD ONC ARIA PLAN NAME: NORMAL
RAD ONC ARIA PLAN PRESCRIBED DOSE PER FRACTION: 3 GY
RAD ONC ARIA PLAN TOTAL FRACTIONS PRESCRIBED: 20
RAD ONC ARIA PLAN TOTAL PRESCRIBED DOSE: 6000 CGY
RAD ONC ARIA REFERENCE POINT DOSAGE GIVEN TO DATE: NORMAL GY
RAD ONC ARIA REFERENCE POINT ID: NORMAL

## 2022-04-06 PROCEDURE — 77385 HC IMRT TREATMENT DELIVERY, SIMPLE: CPT | Performed by: RADIOLOGY

## 2022-04-06 ASSESSMENT — PAIN SCALES - GENERAL: PAINLEVEL: MODERATE PAIN (4)

## 2022-04-07 ENCOUNTER — RESULTS ONLY (OUTPATIENT)
Dept: RADIATION ONCOLOGY | Facility: HOSPITAL | Age: 77
End: 2022-04-07
Payer: MEDICARE

## 2022-04-07 ENCOUNTER — APPOINTMENT (OUTPATIENT)
Dept: RADIATION ONCOLOGY | Facility: HOSPITAL | Age: 77
End: 2022-04-07
Payer: MEDICARE

## 2022-04-07 LAB
RAD ONC ARIA COURSE ID: NORMAL
RAD ONC ARIA COURSE LAST TREATMENT DATE: NORMAL
RAD ONC ARIA COURSE START DATE: NORMAL
RAD ONC ARIA COURSE TREATMENT ELAPSED DAYS: 3
RAD ONC ARIA FIRST TREATMENT DATE: NORMAL
RAD ONC ARIA PLAN FRACTIONS TREATED TO DATE: 4
RAD ONC ARIA PLAN ID: NORMAL
RAD ONC ARIA PLAN NAME: NORMAL
RAD ONC ARIA PLAN PRESCRIBED DOSE PER FRACTION: 3 GY
RAD ONC ARIA PLAN TOTAL FRACTIONS PRESCRIBED: 20
RAD ONC ARIA PLAN TOTAL PRESCRIBED DOSE: 6000 CGY
RAD ONC ARIA REFERENCE POINT DOSAGE GIVEN TO DATE: NORMAL GY
RAD ONC ARIA REFERENCE POINT ID: NORMAL

## 2022-04-07 PROCEDURE — 77385 HC IMRT TREATMENT DELIVERY, SIMPLE: CPT | Performed by: RADIOLOGY

## 2022-04-08 ENCOUNTER — APPOINTMENT (OUTPATIENT)
Dept: RADIATION ONCOLOGY | Facility: HOSPITAL | Age: 77
End: 2022-04-08
Payer: MEDICARE

## 2022-04-08 ENCOUNTER — RESULTS ONLY (OUTPATIENT)
Dept: RADIATION ONCOLOGY | Facility: HOSPITAL | Age: 77
End: 2022-04-08
Payer: MEDICARE

## 2022-04-08 LAB
RAD ONC ARIA COURSE ID: NORMAL
RAD ONC ARIA COURSE LAST TREATMENT DATE: NORMAL
RAD ONC ARIA COURSE START DATE: NORMAL
RAD ONC ARIA COURSE TREATMENT ELAPSED DAYS: 4
RAD ONC ARIA FIRST TREATMENT DATE: NORMAL
RAD ONC ARIA PLAN FRACTIONS TREATED TO DATE: 5
RAD ONC ARIA PLAN ID: NORMAL
RAD ONC ARIA PLAN NAME: NORMAL
RAD ONC ARIA PLAN PRESCRIBED DOSE PER FRACTION: 3 GY
RAD ONC ARIA PLAN TOTAL FRACTIONS PRESCRIBED: 20
RAD ONC ARIA PLAN TOTAL PRESCRIBED DOSE: 6000 CGY
RAD ONC ARIA REFERENCE POINT DOSAGE GIVEN TO DATE: NORMAL GY
RAD ONC ARIA REFERENCE POINT ID: NORMAL

## 2022-04-08 PROCEDURE — 77385 HC IMRT TREATMENT DELIVERY, SIMPLE: CPT | Performed by: RADIOLOGY

## 2022-04-08 PROCEDURE — 77336 RADIATION PHYSICS CONSULT: CPT | Performed by: RADIOLOGY

## 2022-04-08 PROCEDURE — 77014 PR CT GUIDE FOR PLACEMENT RADIATION THERAPY FIELDS: CPT | Mod: 26 | Performed by: RADIOLOGY

## 2022-04-08 PROCEDURE — 77427 RADIATION TX MANAGEMENT X5: CPT | Performed by: RADIOLOGY

## 2022-04-11 ENCOUNTER — APPOINTMENT (OUTPATIENT)
Dept: RADIATION ONCOLOGY | Facility: HOSPITAL | Age: 77
End: 2022-04-11
Payer: MEDICARE

## 2022-04-11 ENCOUNTER — RESULTS ONLY (OUTPATIENT)
Dept: RADIATION ONCOLOGY | Facility: HOSPITAL | Age: 77
End: 2022-04-11
Payer: MEDICARE

## 2022-04-11 LAB
RAD ONC ARIA COURSE ID: NORMAL
RAD ONC ARIA COURSE LAST TREATMENT DATE: NORMAL
RAD ONC ARIA COURSE START DATE: NORMAL
RAD ONC ARIA COURSE TREATMENT ELAPSED DAYS: 7
RAD ONC ARIA FIRST TREATMENT DATE: NORMAL
RAD ONC ARIA PLAN FRACTIONS TREATED TO DATE: 6
RAD ONC ARIA PLAN ID: NORMAL
RAD ONC ARIA PLAN NAME: NORMAL
RAD ONC ARIA PLAN PRESCRIBED DOSE PER FRACTION: 3 GY
RAD ONC ARIA PLAN TOTAL FRACTIONS PRESCRIBED: 20
RAD ONC ARIA PLAN TOTAL PRESCRIBED DOSE: 6000 CGY
RAD ONC ARIA REFERENCE POINT DOSAGE GIVEN TO DATE: NORMAL GY
RAD ONC ARIA REFERENCE POINT ID: NORMAL

## 2022-04-11 PROCEDURE — 77014 PR CT GUIDE FOR PLACEMENT RADIATION THERAPY FIELDS: CPT | Mod: 26 | Performed by: RADIOLOGY

## 2022-04-11 PROCEDURE — 77385 HC IMRT TREATMENT DELIVERY, SIMPLE: CPT | Performed by: RADIOLOGY

## 2022-04-12 ENCOUNTER — APPOINTMENT (OUTPATIENT)
Dept: RADIATION ONCOLOGY | Facility: HOSPITAL | Age: 77
End: 2022-04-12
Payer: MEDICARE

## 2022-04-12 ENCOUNTER — RESULTS ONLY (OUTPATIENT)
Dept: RADIATION ONCOLOGY | Facility: HOSPITAL | Age: 77
End: 2022-04-12
Payer: MEDICARE

## 2022-04-12 LAB
RAD ONC ARIA COURSE ID: NORMAL
RAD ONC ARIA COURSE LAST TREATMENT DATE: NORMAL
RAD ONC ARIA COURSE START DATE: NORMAL
RAD ONC ARIA COURSE TREATMENT ELAPSED DAYS: 8
RAD ONC ARIA FIRST TREATMENT DATE: NORMAL
RAD ONC ARIA PLAN FRACTIONS TREATED TO DATE: 7
RAD ONC ARIA PLAN ID: NORMAL
RAD ONC ARIA PLAN NAME: NORMAL
RAD ONC ARIA PLAN PRESCRIBED DOSE PER FRACTION: 3 GY
RAD ONC ARIA PLAN TOTAL FRACTIONS PRESCRIBED: 20
RAD ONC ARIA PLAN TOTAL PRESCRIBED DOSE: 6000 CGY
RAD ONC ARIA REFERENCE POINT DOSAGE GIVEN TO DATE: NORMAL GY
RAD ONC ARIA REFERENCE POINT ID: NORMAL

## 2022-04-12 PROCEDURE — 77014 PR CT GUIDE FOR PLACEMENT RADIATION THERAPY FIELDS: CPT | Mod: 26 | Performed by: RADIOLOGY

## 2022-04-12 PROCEDURE — 77385 HC IMRT TREATMENT DELIVERY, SIMPLE: CPT | Performed by: RADIOLOGY

## 2022-04-12 NOTE — PROGRESS NOTES
[facility]  DEPARTMENT OF RADIATION ONCOLOGY  ON TREATMENT VISIT (OTV) NOTE    Name: Clem Bishop MRN: [id number]   : 1945 (76 year old)  Date of Service: 2022 Referring: Dr. Cespedes       Diagnosis and Cancer Staging  Prostate cancer (H)  Staging form: Prostate, AJCC 8th Edition  - Clinical stage from 2022: Stage IIC (cT2a, cN0, cM0, PSA: 9.4, Grade Group: 4) - Signed by Ronald Carson MD on 2022      Radiation Therapy   Site: Prostate and pelvic nodes using volumetric-modulated arc therapy (VMAT) with simultaneous integrated boost (SIB) and androgen deprivation therapy (ADT).   Treatment to Date    Received 8 fraction(s) = 2400 cGy (at 300, 220 each)    Remaining 12 fraction(s)    Goal 20 fractions = 6000 cGy (6000, 4400)     Radiation therapy week 0 (fractions 1-5): Grade 0 toxicity due to radiation therapy (CTCAE 5.0).  Week 1 (-10): Grade 2 urinary toxicity (start ibuprofen).  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 Nodes: N0 per CT. Metastasis: M0 per bone scan. Markers: PSA 9.38 on 10/19/2021). ADT started 2022  through urology (6-months course of therapy). 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 The patient relies on his wife for transportation. The effort required for transportation and his specialist van led the patient and his wife to defer radiation therapy until the spring. Baseline : Daytime x6-8, nocturia x1-2, 1 pad per day, occasional leakage, mild urgency and hesitation, requires oxybutynin for nocturia (AUA 11+2). Baseline GI: 1 soft bowel movement each morning.  01/06/2022 CT    (Please note that EMR interfaces between institutions can result in loss of embedded images.)    Recent History (SL)   General: Accompanied by wife. Urination as below. Response to question of follow-up, extended discussion about the role of PSA and expectation of prolonged decline as well as variation of levels after the end of radiation therapy. However, interpretation of PSA will be obscured by the lingering effects of ADT. PSA measurements will become diagnostically useful to guide the status of the prostate cancer once the effects of ADT resolve and testosterone levels return to non-castrate levels. Feels relatively well spite his chronic illnesses.Finds setup and treatment to be easier than he had anticipated. Dara lift for transfer from his wheelchair to the linac couch. No bowel or bladder preparation prior to daily radiation therapy. Have counseled that occasional missed treatment will not compromise the efficacy of therapy and will be made up at the end of the treatment schedule.   :  Reports new daytime urinary frequency, slight dysuria, and slight hesitation. Urinating up to 14 times per day with a measured volume at times almost half of each normal avoid (urinates into a graduated container). Denies hematuria. Reports nocturia x1 when taking ibuprofen at night for back pain. Without ibuprofen, typically wakes 1-2  times per night to urinate. Reviewed medication list and comorbidities for ibuprofen use.  Index: Grade 2 toxicity.  Intervention: Counseled to take 2 ibuprofen twice daily with an additional dose at night as needed. Patient will notify us tomorrow if urination is still bothersome. Anticipate initiation of tamsulosin if required.  Impression: Daytime frequency, slight dysuria, and slight hesitation likely due to inflammatory changes of radiation therapy. Counseled about the pathophysiology, use, toxicity, and safety of ibuprofen. Has elevated risk of mild to morbid urinary toxicity due to baseline dysfunction, prostate size, bladder wall abnormalities, general health, and functional status.  GI: Stable at baseline as above.  Index: Grade 0.  Intervention: Observe.  Impression: Possible changes within 1-2 weeks.  Fatigue: Stable at compromised baseline.  Index: Grade 0 due to radiation therapy.  Intervention: Observe.  Impression: Progressive changes possible within 1-2 weeks.  ID: Response to a question, counseled that radiation therapy is not a barrier for second COVID-19 booster. Daily COVID-19 screening negative for barriers to proceeding with radiation therapy.  Follow-up: After radiation therapy, refer back to urology for appropriate evaluation of additional ADT (currently, short-term ADT anticipated), PSA measurements, and testosterone levels as indicated to assist interpretation of PSA levels for post radiation diagnostic purposes    Prior Oncologic History   The patient's oncologic history across multiple institutions is abstracted as follows:    PSA History:  Date PSA   06/29/2020  7.71   09/30/2020  7.88   01/05/2021  8.20   03/17/2021  8.36   10/19/2021  9.38   12/20/2021  Biopsy   01/25/2022  ADT start     Diagnosis:    11/23/2021 Urology consultation: Referred for evaluation of rising PSA. Recommended biopsy.    12/20/2021 TRUS-guided sextant prostate biopsy: Operative report described an uncomplicated  procedure. Digital rectal examination noted a nodule near the left apex. Biopsy findings as above.    [place]2022 Abdomen/pelvis CT with with contrast: Enlarged prostate deeply invading the bladder base. Not clinically suspicious regional nodes or distant metastatic disease.    2022 Bone scan: Not clinically suspicious for metastatic disease. Sternal and left shoulder findings felt to be traumatic or degenerative in nature.    2022 Urology follow-up: Recommended referral for radiation therapy and discussion about the merits of adding ADT to radiation therapy versus radiation therapy alone. Family had declined ADT as monotherapy (non-curative) due to risks of worsening the patient's pre-existing muscle wasting. Among the other strategies discussed were deferral of initiation of ADT until the PSA rises significantly (i.e., greater than 20) or radical prostatectomy (not recommended due to significant comorbidities).    2020 Start of androgen deprivation therapy (ADT): Short-course of ADT as an adjunct to definitive pelvic radiation therapy.    2022 Placement of interstitial perirectal hydrogel and prostate fiducial markers (tentative date).    Chemotherapy History: None.  Radiation History: Yes (as above).  Implanted Cardiac Devices: None.  Implanted Chest Wall Infusion Port: None.    Past Medical History:   Diagnosis Date     Actinic keratosis      Broken arm     patient thinks it was his right arm.     Cerebrovascular accident (H) 2019    Acute left thalamaic, facial numbness, slurred speech     Chronic deep vein thrombosis (DVT) of left lower extremity, unspecified vein (H)      Diabetes mellitus without complication (H)      Elevated prostate specific antigen (PSA)     less than 10ng/ml     History of basal cell carcinoma      Hypertension      Hypertension, essential      Long term current use of anticoagulant therapy      Mixed hyperlipidemia      Neurosarcoidosis      Paraplegia since 2005     Paraplegia (H) 2005     Personal history of venous thrombosis and embolism     on warfarin     Prostate cancer (H)      Past Surgical History:   Procedure Laterality Date     COLONOSCOPY - HIM SCAN  04/19/2006    Colonoscopy at the [facility] 4/16/2006     EGD  04/20/2006     EXCISE LESION EAR EXTERNAL Left 06/23/2016    Procedure: EXCISE LESION EAR EXTERNAL;  Surgeon: Von Suresh DO;  Location: [facility] OR     FLEXIBLE SIGMOIDOSCOPY - HIM SCAN  03/24/2000    Flex Sigmoidoscopy - Hyperplastic polyp     SPINAL PUNCTURE,LUMBAR, DIAGNOSTIC  07/07/2005    diaphragm punctured     Outpatient Encounter Medications as of 4/13/2022   Medication Sig Dispense Refill     amLODIPine (NORVASC) 10 MG tablet Take 1 tablet (10 mg) by mouth daily 30 tablet 0     aspirin (ASA) 81 MG EC tablet Take 1 tablet (81 mg) by mouth daily 30 tablet 0     atenolol (TENORMIN) 50 MG tablet Take 50 mg by mouth daily       calcium carbonate 500 mg, elemental, (OSCAL 500) 1250 (500 Ca) MG TABS tablet Take by mouth 2 times daily 250 mg BID       glyBURIDE (DIABETA /MICRONASE) 5 MG tablet Take 10 mg by mouth 2 times daily (with meals)        ibuprofen (ADVIL,MOTRIN) 600 MG tablet Take 1 tablet (600 mg) by mouth every 6 hours as needed for pain (Patient not taking: Reported on 4/6/2022) 30 tablet 0     JANTOVEN ANTICOAGULANT 1 MG tablet 1mg 4 times per week, 1.5mg 3 times per week.  Every 6 week blood drawn.       losartan (COZAAR) 100 MG tablet Take 100 mg by mouth daily        METFORMIN HCL ER PO Take 1,000 mg by mouth 2 times daily       omeprazole (PRILOSEC) 20 MG DR capsule Take 40 mg by mouth every morning        ONETOUCH ULTRA test strip daily        oxybutynin (DITROPAN-XL) 5 MG 24 hr tablet Take 5 mg by mouth daily        simvastatin (ZOCOR) 10 MG tablet Take 1 tablet (10 mg) by mouth At Bedtime 30 tablet 0     triamcinolone (KENALOG) 0.1 % external cream        No facility-administered encounter medications on file as  of 4/13/2022.     Allergies/Reactions: Seasonal allergies    Orders via Epic EMR: No orders of the defined types were placed in this encounter.    Social History     Social History Narrative     with three children, wife is caregive. 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    Hobbies: Use to carve wood, use to draw, paint, used to do rock work & carpentry, able to carve walking sticks at times, hard with limited right hand function.     Socioeconomic History     Marital status:      Spouse name: Myranda     Number of children: 3     Highest education level: High school graduate   Occupational History     Occupation: Disabled:   Blaster     Employer: [employer]     Social History     Tobacco Use     Smoking status: Never Smoker     Smokeless tobacco: Never Used   Vaping Use     Vaping Use: Never used   Substance Use Topics     Alcohol use: Not Currently     Comment: once in a while has a glass of wine with wife     Drug use: Never     Family History   Problem Relation Age of Onset     Hypertension Mother      Cerebrovascular Disease Father      Emphysema Father      Breast Cancer Sister      Fibromyalgia Sister      Cerebrovascular Disease Sister      Brain Cancer Sister      Coronary Artery Disease Sister      Prostate Cancer Brother      Lupus Brother      Coronary Artery Disease Son    No  other cancers in first or second degree relatives.    Baseline Review of Systems (prior to radiation therapy; supplemental to the History)   ROS (score of 0 indicates that symptom is at baseline for most sections below): See Flowsheet for additional comments as indicated.  Data Unavailable due to chronic right shoulder pain    Patient Health Questionnaire-9 (PHQ-9)   No flowsheet data found.    Physical Exam   KPS: 60 (requires occasional assistance but cares for most personal needs).  ECOG Status: 3 (Capable of only limited self-care; needs help with ADLs; in bed/chair >50% of waking  hours)  Vitals: There were no vitals taken for this visit.  Wt Readings from Last 3 Encounters:   01/25/22 83.9 kg (185 lb)   01/17/22 83.9 kg (185 lb)   11/24/19 88.2 kg (194 lb 7.1 oz)     Clinical Examination (during the week, additional physician evaluations at lin with the staff)   General: Appears clinically/medically stable. Appears in fair general health. Overweight (BMI 25-29). Appears slightly fatigued. Appears stated age. Appears well-developed, well-nourished, and in no acute distress. Does not appear acutely or chronically ill. Appears well groomed.  Head: Natural alopecia pattern. Normocephalic.  Eyes: Glasses. Anicteric, vision intact.  ENT: Lips normal colored. Good volume. No hoarseness.  Neck: Symmetric, trachea midline.  Lymphatics: Deferred.  Chest/Breasts: No port. No implanted cardiac device.   Lungs: Easy respirations, no use of accessory muscles.  Cardiovascular: No jugulovenous distension. No carotid pulsations.  Abdomen: No erythema. Nondistended..  Pelvis: No erythema. Nondistended..  MSK: Wheelchair. Paraplegia. Thin calf muscles with good muscle tone. Good posture.  Integument: No jaundice or pallor.  Neurologic: Right-handed. Alert, oriented, fluent. Memory intact. Paraplegia with right-sided motor deficits (worsens distally).  Psychologic: Well-spoken about his cancer and therapy. Good dynamic with his wife. Motivated, upbeat, relaxed, confident, and engaging. Pleasant, cooperative, insightful, and concrete.    Physician Time on Day of Encounter, and [facility] Data Reviewed and Analyzed on Day of Encounter   Not applicable.    Physician Radiation Therapy Information Review   Radiation Oncology weekly review: Reviewed available labs and diagnostic studies. Interpreted as adequate to proceed with radiation therapy. Discussed management with Therapy, Treatment Planning, and Nursing. Review or weekly routine QA, linac imaging, and clinical set up are adequate to proceed with radiation therapy  as prescribed.    Physician Radiation Therapy Assessment   Routine tolerance to radiation therapy.    Physician Radiation Therapy Plan   No new radiation therapy interventions. No separate boost/cone down phase since the plan utilizes a simultaneous integrated boost (SIB). Have reviewed the graphical 3D plan with the patient and his wife with attention to dose to the prostate region, regional nodes, rectum, bladder, small bowel bowel, large bowel, hips, etc. Reviewed anticipated time course, nature, and potential interventions for managing toxicity during and after radiation therapy. Patient aware of onsite and telemedicine coverage of our clinic. He wished to proceed with treatment. All questions answered to the satisfaction of the patient and his wife.      Ronald Carson MD, PhD  Department of Radiation Oncology  Tel: [phone]  Fax: [phone]    Care Team:  Marysol Cespedes M.D. (urology)  Tayo Logan M.D. (neurology)  Anoop Bowling M.D. (medicine)

## 2022-04-13 ENCOUNTER — OFFICE VISIT (OUTPATIENT)
Dept: RADIATION ONCOLOGY | Facility: HOSPITAL | Age: 77
End: 2022-04-13
Payer: MEDICARE

## 2022-04-13 ENCOUNTER — APPOINTMENT (OUTPATIENT)
Dept: RADIATION ONCOLOGY | Facility: HOSPITAL | Age: 77
End: 2022-04-13
Payer: MEDICARE

## 2022-04-13 ENCOUNTER — RESULTS ONLY (OUTPATIENT)
Dept: RADIATION ONCOLOGY | Facility: HOSPITAL | Age: 77
End: 2022-04-13
Payer: MEDICARE

## 2022-04-13 DIAGNOSIS — C61 PROSTATE CANCER (H): Primary | Chronic | ICD-10-CM

## 2022-04-13 LAB
RAD ONC ARIA COURSE ID: NORMAL
RAD ONC ARIA COURSE LAST TREATMENT DATE: NORMAL
RAD ONC ARIA COURSE START DATE: NORMAL
RAD ONC ARIA COURSE TREATMENT ELAPSED DAYS: 9
RAD ONC ARIA FIRST TREATMENT DATE: NORMAL
RAD ONC ARIA PLAN FRACTIONS TREATED TO DATE: 8
RAD ONC ARIA PLAN ID: NORMAL
RAD ONC ARIA PLAN NAME: NORMAL
RAD ONC ARIA PLAN PRESCRIBED DOSE PER FRACTION: 3 GY
RAD ONC ARIA PLAN TOTAL FRACTIONS PRESCRIBED: 20
RAD ONC ARIA PLAN TOTAL PRESCRIBED DOSE: 6000 CGY
RAD ONC ARIA REFERENCE POINT DOSAGE GIVEN TO DATE: NORMAL GY
RAD ONC ARIA REFERENCE POINT ID: NORMAL

## 2022-04-13 PROCEDURE — 77385 HC IMRT TREATMENT DELIVERY, SIMPLE: CPT | Performed by: RADIOLOGY

## 2022-04-13 PROCEDURE — 77014 PR CT GUIDE FOR PLACEMENT RADIATION THERAPY FIELDS: CPT | Mod: 26 | Performed by: RADIOLOGY

## 2022-04-13 ASSESSMENT — PAIN SCALES - GENERAL: PAINLEVEL: MILD PAIN (3)

## 2022-04-14 ENCOUNTER — RESULTS ONLY (OUTPATIENT)
Dept: RADIATION ONCOLOGY | Facility: HOSPITAL | Age: 77
End: 2022-04-14
Payer: MEDICARE

## 2022-04-14 ENCOUNTER — APPOINTMENT (OUTPATIENT)
Dept: RADIATION ONCOLOGY | Facility: HOSPITAL | Age: 77
End: 2022-04-14
Payer: MEDICARE

## 2022-04-14 LAB
RAD ONC ARIA COURSE ID: NORMAL
RAD ONC ARIA COURSE LAST TREATMENT DATE: NORMAL
RAD ONC ARIA COURSE START DATE: NORMAL
RAD ONC ARIA COURSE TREATMENT ELAPSED DAYS: 10
RAD ONC ARIA FIRST TREATMENT DATE: NORMAL
RAD ONC ARIA PLAN FRACTIONS TREATED TO DATE: 9
RAD ONC ARIA PLAN ID: NORMAL
RAD ONC ARIA PLAN NAME: NORMAL
RAD ONC ARIA PLAN PRESCRIBED DOSE PER FRACTION: 3 GY
RAD ONC ARIA PLAN TOTAL FRACTIONS PRESCRIBED: 20
RAD ONC ARIA PLAN TOTAL PRESCRIBED DOSE: 6000 CGY
RAD ONC ARIA REFERENCE POINT DOSAGE GIVEN TO DATE: NORMAL GY
RAD ONC ARIA REFERENCE POINT ID: NORMAL

## 2022-04-14 PROCEDURE — 77014 PR CT GUIDE FOR PLACEMENT RADIATION THERAPY FIELDS: CPT | Mod: 26 | Performed by: RADIOLOGY

## 2022-04-14 PROCEDURE — 77385 HC IMRT TREATMENT DELIVERY, SIMPLE: CPT | Performed by: RADIOLOGY

## 2022-04-18 ENCOUNTER — RESULTS ONLY (OUTPATIENT)
Dept: RADIATION ONCOLOGY | Facility: HOSPITAL | Age: 77
End: 2022-04-18
Payer: MEDICARE

## 2022-04-18 ENCOUNTER — APPOINTMENT (OUTPATIENT)
Dept: RADIATION ONCOLOGY | Facility: HOSPITAL | Age: 77
End: 2022-04-18
Payer: MEDICARE

## 2022-04-18 LAB
RAD ONC ARIA COURSE ID: NORMAL
RAD ONC ARIA COURSE LAST TREATMENT DATE: NORMAL
RAD ONC ARIA COURSE START DATE: NORMAL
RAD ONC ARIA COURSE TREATMENT ELAPSED DAYS: 14
RAD ONC ARIA FIRST TREATMENT DATE: NORMAL
RAD ONC ARIA PLAN FRACTIONS TREATED TO DATE: 10
RAD ONC ARIA PLAN ID: NORMAL
RAD ONC ARIA PLAN NAME: NORMAL
RAD ONC ARIA PLAN PRESCRIBED DOSE PER FRACTION: 3 GY
RAD ONC ARIA PLAN TOTAL FRACTIONS PRESCRIBED: 20
RAD ONC ARIA PLAN TOTAL PRESCRIBED DOSE: 6000 CGY
RAD ONC ARIA REFERENCE POINT DOSAGE GIVEN TO DATE: NORMAL GY
RAD ONC ARIA REFERENCE POINT ID: NORMAL

## 2022-04-18 PROCEDURE — 77427 RADIATION TX MANAGEMENT X5: CPT | Performed by: RADIOLOGY

## 2022-04-18 PROCEDURE — 77014 PR CT GUIDE FOR PLACEMENT RADIATION THERAPY FIELDS: CPT | Mod: 26 | Performed by: RADIOLOGY

## 2022-04-18 PROCEDURE — 77385 HC IMRT TREATMENT DELIVERY, SIMPLE: CPT | Performed by: RADIOLOGY

## 2022-04-18 PROCEDURE — 77336 RADIATION PHYSICS CONSULT: CPT | Performed by: RADIOLOGY

## 2022-04-19 ENCOUNTER — APPOINTMENT (OUTPATIENT)
Dept: RADIATION ONCOLOGY | Facility: HOSPITAL | Age: 77
End: 2022-04-19
Payer: MEDICARE

## 2022-04-19 ENCOUNTER — RESULTS ONLY (OUTPATIENT)
Dept: RADIATION ONCOLOGY | Facility: HOSPITAL | Age: 77
End: 2022-04-19
Payer: MEDICARE

## 2022-04-19 LAB
RAD ONC ARIA COURSE ID: NORMAL
RAD ONC ARIA COURSE LAST TREATMENT DATE: NORMAL
RAD ONC ARIA COURSE START DATE: NORMAL
RAD ONC ARIA COURSE TREATMENT ELAPSED DAYS: 15
RAD ONC ARIA FIRST TREATMENT DATE: NORMAL
RAD ONC ARIA PLAN FRACTIONS TREATED TO DATE: 11
RAD ONC ARIA PLAN ID: NORMAL
RAD ONC ARIA PLAN NAME: NORMAL
RAD ONC ARIA PLAN PRESCRIBED DOSE PER FRACTION: 3 GY
RAD ONC ARIA PLAN TOTAL FRACTIONS PRESCRIBED: 20
RAD ONC ARIA PLAN TOTAL PRESCRIBED DOSE: 6000 CGY
RAD ONC ARIA REFERENCE POINT DOSAGE GIVEN TO DATE: NORMAL GY
RAD ONC ARIA REFERENCE POINT ID: NORMAL

## 2022-04-19 PROCEDURE — 77014 PR CT GUIDE FOR PLACEMENT RADIATION THERAPY FIELDS: CPT | Mod: 26 | Performed by: RADIOLOGY

## 2022-04-19 PROCEDURE — 77385 HC IMRT TREATMENT DELIVERY, SIMPLE: CPT | Performed by: RADIOLOGY

## 2022-04-19 NOTE — PROGRESS NOTES
[facility]  DEPARTMENT OF RADIATION ONCOLOGY  ON TREATMENT VISIT (OTV) NOTE    Name: Clem Bishop MRN: [id number]   : 1945 (76 year old)  Date of Service: 2022 Referring: Dr. Cespedes       Diagnosis and Cancer Staging  Prostate cancer (H)  Staging form: Prostate, AJCC 8th Edition  - Clinical stage from 2022: Stage IIC (cT2a, cN0, cM0, PSA: 9.4, Grade Group: 4) - Signed by Ronald Carson MD on 2022      Radiation Therapy   Site: Prostate and pelvic nodes using volumetric-modulated arc therapy (VMAT) with simultaneous integrated boost (SIB) and androgen deprivation therapy (ADT).   Treatment to Date    Received 12 fraction(s) = 3600 cGy (at 300, 220 each)    Remaining 8 fraction(s)    Goal 20 fractions = 6000 cGy (6000, 4400)     Radiation therapy week 0 (fractions 1-5): Grade 0 toxicity due to radiation therapy (CTCAE 5.0).  Week 1 (-10): Grade 2 urinary toxicity (start ibuprofen).  Week 2 (11-15): Grade 2 urinary toxicity (ibuprofen).  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 Nodes: N0 per CT. Metastasis: M0 per bone scan. Markers: PSA 9.38 on 10/19/2021). ADT started 2022  through urology (6-months course of therapy). 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 The patient relies on his wife for transportation. The effort required for transportation and his specialist van led the patient and his wife to defer radiation therapy until the spring. Baseline : Daytime x6-8, nocturia x1-2, 1 pad per day, occasional leakage, mild urgency and hesitation, requires oxybutynin for nocturia (AUA 11+2). Baseline GI: 1 soft bowel movement each morning.  01/06/2022 CT    (Please note that EMR interfaces between institutions can result in loss of embedded images.)    Recent History (SL)   General: Accompanied by wife. Left flank pain and urination as below. Offers no other new complaints. Very pleased with treatment. Feels relatively well spite his chronic illnesses.Finds setup and treatment to be easier than he had anticipated. Dara lift for transfer from his wheelchair to the linac couch. No bowel or bladder preparation prior to daily radiation therapy. Have counseled that occasional missed treatment will not compromise the efficacy of therapy and will be made up at the end of the treatment schedule.   :  Reports the development last night of focal left posterior lower flank pain. Confirmed that the pain is similar to contralateral pain he experienced with passage of ureteral stones. Very pleased with the significant improvement daytime urination frequency after starting ibuprofen. Continues to take ibuprofen 2 times per day and sometimes before sleep. Urination down from 14 times during the day to only 8-9 times during the day with ibuprofen. Flow also improved after starting ibuprofen. Wakes 1-2 times at night to urinate (takes ibuprofen at night for back pain). Feels that urine volume is back to \"normal\" (measures each void due to his collection device). " Denies hematuria. Reports nocturia x1 when taking ibuprofen at night for back pain. Without ibuprofen, typically wakes 1-2 times per night to urinate. Reviewed medication list and comorbidities for ibuprofen use.  Index: Grade 2 toxicity versus nephroureterolithiasis.  Intervention: Counseled to taper ibuprofen to the lowest effective dose. We will begin by decreasing each dose to 1 tablet.  Impression: Etiology of the prior urinary changes likely due to radiation therapy, but the possibility of nephroureterolithiasis is raised by the patient's recent left lower posterior flank pain. The new pain is moderate. Additional pain medication is not desired by the patient. Has elevated risk of mild to morbid urinary toxicity due to baseline dysfunction, prostate size, bladder wall abnormalities, general health, and functional status.  GI: Stable at baseline as above.  Index: Grade 0.  Intervention: Observe.  Impression: Possible changes within 1-2 weeks.  Fatigue: Stable at compromised baseline.  Index: Grade 0 due to radiation therapy.  Intervention: Observe.  Impression: Progressive changes possible within 1-2 weeks.  ID: Response to a question, counseled that radiation therapy is not a barrier for second COVID-19 booster. Daily COVID-19 screening negative for barriers to proceeding with radiation therapy.  Follow-up: After radiation therapy, refer back to urology for appropriate evaluation of additional ADT (currently, short-term ADT anticipated), PSA measurements, and testosterone levels as indicated to assist interpretation of PSA levels for post radiation diagnostic purposes    Prior Oncologic History   The patient's oncologic history across multiple institutions is abstracted as follows:    PSA History:  Date PSA   06/29/2020  7.71   09/30/2020  7.88   01/05/2021  8.20   03/17/2021  8.36   10/19/2021  9.38   12/20/2021  Biopsy   01/25/2022  ADT start     Diagnosis:    11/23/2021 Urology consultation: Referred for  evaluation of rising PSA. Recommended biopsy.    2021 TRUS-guided sextant prostate biopsy: Operative report described an uncomplicated procedure. Digital rectal examination noted a nodule near the left apex. Biopsy findings as above.    [place]2022 Abdomen/pelvis CT with with contrast: Enlarged prostate deeply invading the bladder base. Not clinically suspicious regional nodes or distant metastatic disease.    2022 Bone scan: Not clinically suspicious for metastatic disease. Sternal and left shoulder findings felt to be traumatic or degenerative in nature.    2022 Urology follow-up: Recommended referral for radiation therapy and discussion about the merits of adding ADT to radiation therapy versus radiation therapy alone. Family had declined ADT as monotherapy (non-curative) due to risks of worsening the patient's pre-existing muscle wasting. Among the other strategies discussed were deferral of initiation of ADT until the PSA rises significantly (i.e., greater than 20) or radical prostatectomy (not recommended due to significant comorbidities).    2020 Start of androgen deprivation therapy (ADT): Short-course of ADT as an adjunct to definitive pelvic radiation therapy.    2022 Placement of interstitial perirectal hydrogel and prostate fiducial markers (tentative date).    Chemotherapy History: None.  Radiation History: Yes (as above).  Implanted Cardiac Devices: None.  Implanted Chest Wall Infusion Port: None.    Past Medical History:   Diagnosis Date     Actinic keratosis      Broken arm     patient thinks it was his right arm.     Cerebrovascular accident (H) 2019    Acute left thalamaic, facial numbness, slurred speech     Chronic deep vein thrombosis (DVT) of left lower extremity, unspecified vein (H)      Diabetes mellitus without complication (H)      Elevated prostate specific antigen (PSA)     less than 10ng/ml     History of basal cell carcinoma       Hypertension      Hypertension, essential      Long term current use of anticoagulant therapy      Mixed hyperlipidemia      Neurosarcoidosis 2005    Paraplegia since 2005     Paraplegia (H) 2005     Personal history of venous thrombosis and embolism     on warfarin     Prostate cancer (H)      Past Surgical History:   Procedure Laterality Date     COLONOSCOPY - HIM SCAN  04/19/2006    Colonoscopy at the [facility] 4/16/2006     EGD  04/20/2006     EXCISE LESION EAR EXTERNAL Left 06/23/2016    Procedure: EXCISE LESION EAR EXTERNAL;  Surgeon: Von Suresh DO;  Location: [facility] OR     FLEXIBLE SIGMOIDOSCOPY - HIM SCAN  03/24/2000    Flex Sigmoidoscopy - Hyperplastic polyp     SPINAL PUNCTURE,LUMBAR, DIAGNOSTIC  07/07/2005    diaphragm punctured     Outpatient Encounter Medications as of 4/20/2022   Medication Sig Dispense Refill     amLODIPine (NORVASC) 10 MG tablet Take 1 tablet (10 mg) by mouth daily 30 tablet 0     aspirin (ASA) 81 MG EC tablet Take 1 tablet (81 mg) by mouth daily 30 tablet 0     atenolol (TENORMIN) 50 MG tablet Take 50 mg by mouth daily       calcium carbonate 500 mg, elemental, (OSCAL 500) 1250 (500 Ca) MG TABS tablet Take by mouth 2 times daily 250 mg BID       glyBURIDE (DIABETA /MICRONASE) 5 MG tablet Take 10 mg by mouth 2 times daily (with meals)        ibuprofen (ADVIL,MOTRIN) 600 MG tablet Take 1 tablet (600 mg) by mouth every 6 hours as needed for pain 30 tablet 0     JANTOVEN ANTICOAGULANT 1 MG tablet 1mg 4 times per week, 1.5mg 3 times per week.  Every 6 week blood drawn.       losartan (COZAAR) 100 MG tablet Take 100 mg by mouth daily        METFORMIN HCL ER PO Take 1,000 mg by mouth 2 times daily       omeprazole (PRILOSEC) 20 MG DR capsule Take 40 mg by mouth every morning        ONETOUCH ULTRA test strip daily        oxybutynin (DITROPAN-XL) 5 MG 24 hr tablet Take 5 mg by mouth daily        simvastatin (ZOCOR) 10 MG tablet Take 1 tablet (10 mg) by mouth At Bedtime 30 tablet 0      triamcinolone (KENALOG) 0.1 % external cream        No facility-administered encounter medications on file as of 4/20/2022.     Allergies/Reactions: Seasonal allergies    Orders via Epic EMR: No orders of the defined types were placed in this encounter.    Social History     Social History Narrative     with three children, wife is caregive. 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    Hobbies: Use to carve wood, use to draw, paint, used to do rock work & carpentry, able to carve walking sticks at times, hard with limited right hand function.     Socioeconomic History     Marital status:      Spouse name: Myranda     Number of children: 3     Highest education level: High school graduate   Occupational History     Occupation: Disabled:   Blaster     Employer: [email or web address]     Social History     Tobacco Use     Smoking status: Never Smoker     Smokeless tobacco: Never Used   Vaping Use     Vaping Use: Never used   Substance Use Topics     Alcohol use: Not Currently     Comment: once in a while has a glass of wine with wife     Drug use: Never     Family History   Problem Relation Age of Onset     Hypertension Mother      Cerebrovascular Disease Father      Emphysema Father      Breast Cancer Sister      Fibromyalgia Sister      Cerebrovascular Disease Sister      Brain Cancer Sister      Coronary Artery Disease Sister      Prostate Cancer Brother      Lupus Brother      Coronary Artery Disease Son    No  other cancers in first or second degree relatives.    Baseline Review of Systems (prior to radiation therapy; supplemental to the History)   ROS (score of 0 indicates that symptom is at baseline for most sections below): See Flowsheet for additional comments as indicated.  Severe Pain (6) due to chronic right shoulder pain    Patient Health Questionnaire-9 (PHQ-9)   No flowsheet data found.    Physical Exam   KPS: 60 (requires occasional assistance but cares for most personal needs).  ECOG  Status: 3 (Capable of only limited self-care; needs help with ADLs; in bed/chair >50% of waking hours)  Vitals: There were no vitals taken for this visit.  Wt Readings from Last 3 Encounters:   01/25/22 83.9 kg (185 lb)   01/17/22 83.9 kg (185 lb)   11/24/19 88.2 kg (194 lb 7.1 oz)     Clinical Examination (during the week, additional physician evaluations at linac with the staff)   General: Appears clinically/medically stable. Appears in fair general health. Overweight (BMI 25-29). Appears slightly fatigued. Appears stated age. Appears well-developed, well-nourished, and in no acute distress. Does not appear acutely or chronically ill. Appears well groomed.  Head: Natural alopecia pattern. Normocephalic.  Eyes: Glasses. Anicteric, vision intact.  ENT: Lips normal colored. Good volume. No hoarseness.  Neck: Symmetric, trachea midline.  Lymphatics: Deferred.  Chest/Breasts: No port. No implanted cardiac device.   Lungs: Easy respirations, no use of accessory muscles. Clear to auscultation.  Cardiovascular: No jugulovenous distension. No carotid pulsations. RRR, S1, S2.  Abdomen: No erythema. Nondistended..  Pelvis: No erythema. Nondistended..  MSK: Wheelchair. Paraplegia. Thin calf muscles with good muscle tone. Good posture.  Integument: No jaundice or pallor.  Neurologic: Right-handed. Alert, oriented, fluent. Memory intact. Paraplegia with right-sided motor deficits (worsens distally).  Psychologic: Well-spoken about his cancer and therapy. Good dynamic with his wife. Motivated, upbeat, relaxed, confident, and engaging. Pleasant, cooperative, insightful, and concrete.    Physician Time on Day of Encounter, and [facility] Data Reviewed and Analyzed on Day of Encounter   Not applicable.    Physician Radiation Therapy Information Review   Radiation Oncology weekly review: Reviewed available labs and diagnostic studies. Interpreted as adequate to proceed with radiation therapy. Discussed management with Therapy, Treatment  Planning, and Nursing. Review or weekly routine QA, linac imaging, and clinical set up are adequate to proceed with radiation therapy as prescribed.    Physician Radiation Therapy Assessment   Routine tolerance to radiation therapy.    Physician Radiation Therapy Plan   No new radiation therapy interventions. No separate boost/cone down phase since the plan utilizes a simultaneous integrated boost (SIB). Have reviewed the graphical 3D plan with the patient and his wife with attention to dose to the prostate region, regional nodes, rectum, bladder, small bowel bowel, large bowel, hips, etc. Reviewed anticipated time course, nature, and potential interventions for managing toxicity during and after radiation therapy. Patient aware of onsite and telemedicine coverage of our clinic. He wished to proceed with treatment. All questions answered to the satisfaction of the patient and his wife.      Ronald Carson MD, PhD  Department of Radiation Oncology  Tel: [phone]  Fax: [phone]    Care Team:  Marysol Cespedes M.D. (urology)  Tayo Logan M.D. (neurology)  Anoop Bowling M.D. (medicine)

## 2022-04-20 ENCOUNTER — RESULTS ONLY (OUTPATIENT)
Dept: RADIATION ONCOLOGY | Facility: HOSPITAL | Age: 77
End: 2022-04-20
Payer: MEDICARE

## 2022-04-20 ENCOUNTER — OFFICE VISIT (OUTPATIENT)
Dept: RADIATION ONCOLOGY | Facility: HOSPITAL | Age: 77
End: 2022-04-20
Payer: MEDICARE

## 2022-04-20 ENCOUNTER — APPOINTMENT (OUTPATIENT)
Dept: RADIATION ONCOLOGY | Facility: HOSPITAL | Age: 77
End: 2022-04-20
Payer: MEDICARE

## 2022-04-20 DIAGNOSIS — C61 PROSTATE CANCER (H): Primary | Chronic | ICD-10-CM

## 2022-04-20 LAB
RAD ONC ARIA COURSE ID: NORMAL
RAD ONC ARIA COURSE LAST TREATMENT DATE: NORMAL
RAD ONC ARIA COURSE START DATE: NORMAL
RAD ONC ARIA COURSE TREATMENT ELAPSED DAYS: 16
RAD ONC ARIA FIRST TREATMENT DATE: NORMAL
RAD ONC ARIA PLAN FRACTIONS TREATED TO DATE: 12
RAD ONC ARIA PLAN ID: NORMAL
RAD ONC ARIA PLAN NAME: NORMAL
RAD ONC ARIA PLAN PRESCRIBED DOSE PER FRACTION: 3 GY
RAD ONC ARIA PLAN TOTAL FRACTIONS PRESCRIBED: 20
RAD ONC ARIA PLAN TOTAL PRESCRIBED DOSE: 6000 CGY
RAD ONC ARIA REFERENCE POINT DOSAGE GIVEN TO DATE: NORMAL GY
RAD ONC ARIA REFERENCE POINT ID: NORMAL

## 2022-04-20 PROCEDURE — 77014 PR CT GUIDE FOR PLACEMENT RADIATION THERAPY FIELDS: CPT | Mod: 26 | Performed by: RADIOLOGY

## 2022-04-20 PROCEDURE — 77385 HC IMRT TREATMENT DELIVERY, SIMPLE: CPT | Performed by: RADIOLOGY

## 2022-04-20 ASSESSMENT — PAIN SCALES - GENERAL: PAINLEVEL: SEVERE PAIN (6)

## 2022-04-22 ENCOUNTER — RESULTS ONLY (OUTPATIENT)
Dept: RADIATION ONCOLOGY | Facility: HOSPITAL | Age: 77
End: 2022-04-22
Payer: MEDICARE

## 2022-04-22 ENCOUNTER — APPOINTMENT (OUTPATIENT)
Dept: RADIATION ONCOLOGY | Facility: HOSPITAL | Age: 77
End: 2022-04-22
Payer: MEDICARE

## 2022-04-22 LAB
RAD ONC ARIA COURSE ID: NORMAL
RAD ONC ARIA COURSE LAST TREATMENT DATE: NORMAL
RAD ONC ARIA COURSE START DATE: NORMAL
RAD ONC ARIA COURSE TREATMENT ELAPSED DAYS: 18
RAD ONC ARIA FIRST TREATMENT DATE: NORMAL
RAD ONC ARIA PLAN FRACTIONS TREATED TO DATE: 13
RAD ONC ARIA PLAN ID: NORMAL
RAD ONC ARIA PLAN NAME: NORMAL
RAD ONC ARIA PLAN PRESCRIBED DOSE PER FRACTION: 3 GY
RAD ONC ARIA PLAN TOTAL FRACTIONS PRESCRIBED: 20
RAD ONC ARIA PLAN TOTAL PRESCRIBED DOSE: 6000 CGY
RAD ONC ARIA REFERENCE POINT DOSAGE GIVEN TO DATE: NORMAL GY
RAD ONC ARIA REFERENCE POINT ID: NORMAL

## 2022-04-22 PROCEDURE — 77014 PR CT GUIDE FOR PLACEMENT RADIATION THERAPY FIELDS: CPT | Mod: 26 | Performed by: RADIOLOGY

## 2022-04-22 PROCEDURE — 77385 HC IMRT TREATMENT DELIVERY, SIMPLE: CPT | Performed by: RADIOLOGY

## 2022-04-25 ENCOUNTER — APPOINTMENT (OUTPATIENT)
Dept: RADIATION ONCOLOGY | Facility: HOSPITAL | Age: 77
End: 2022-04-25
Payer: MEDICARE

## 2022-04-25 ENCOUNTER — RESULTS ONLY (OUTPATIENT)
Dept: RADIATION ONCOLOGY | Facility: HOSPITAL | Age: 77
End: 2022-04-25
Payer: MEDICARE

## 2022-04-25 LAB
RAD ONC ARIA COURSE ID: NORMAL
RAD ONC ARIA COURSE LAST TREATMENT DATE: NORMAL
RAD ONC ARIA COURSE START DATE: NORMAL
RAD ONC ARIA COURSE TREATMENT ELAPSED DAYS: 21
RAD ONC ARIA FIRST TREATMENT DATE: NORMAL
RAD ONC ARIA PLAN FRACTIONS TREATED TO DATE: 14
RAD ONC ARIA PLAN ID: NORMAL
RAD ONC ARIA PLAN NAME: NORMAL
RAD ONC ARIA PLAN PRESCRIBED DOSE PER FRACTION: 3 GY
RAD ONC ARIA PLAN TOTAL FRACTIONS PRESCRIBED: 20
RAD ONC ARIA PLAN TOTAL PRESCRIBED DOSE: 6000 CGY
RAD ONC ARIA REFERENCE POINT DOSAGE GIVEN TO DATE: NORMAL GY
RAD ONC ARIA REFERENCE POINT ID: NORMAL

## 2022-04-25 PROCEDURE — 77385 HC IMRT TREATMENT DELIVERY, SIMPLE: CPT | Performed by: RADIOLOGY

## 2022-04-25 PROCEDURE — 77014 PR CT GUIDE FOR PLACEMENT RADIATION THERAPY FIELDS: CPT | Mod: 26 | Performed by: RADIOLOGY

## 2022-04-26 ENCOUNTER — APPOINTMENT (OUTPATIENT)
Dept: RADIATION ONCOLOGY | Facility: HOSPITAL | Age: 77
End: 2022-04-26
Payer: MEDICARE

## 2022-04-26 ENCOUNTER — RESULTS ONLY (OUTPATIENT)
Dept: RADIATION ONCOLOGY | Facility: HOSPITAL | Age: 77
End: 2022-04-26
Payer: MEDICARE

## 2022-04-26 LAB
RAD ONC ARIA COURSE ID: NORMAL
RAD ONC ARIA COURSE LAST TREATMENT DATE: NORMAL
RAD ONC ARIA COURSE START DATE: NORMAL
RAD ONC ARIA COURSE TREATMENT ELAPSED DAYS: 22
RAD ONC ARIA FIRST TREATMENT DATE: NORMAL
RAD ONC ARIA PLAN FRACTIONS TREATED TO DATE: 15
RAD ONC ARIA PLAN ID: NORMAL
RAD ONC ARIA PLAN NAME: NORMAL
RAD ONC ARIA PLAN PRESCRIBED DOSE PER FRACTION: 3 GY
RAD ONC ARIA PLAN TOTAL FRACTIONS PRESCRIBED: 20
RAD ONC ARIA PLAN TOTAL PRESCRIBED DOSE: 6000 CGY
RAD ONC ARIA REFERENCE POINT DOSAGE GIVEN TO DATE: NORMAL GY
RAD ONC ARIA REFERENCE POINT ID: NORMAL

## 2022-04-26 PROCEDURE — 77014 PR CT GUIDE FOR PLACEMENT RADIATION THERAPY FIELDS: CPT | Mod: 26 | Performed by: RADIOLOGY

## 2022-04-26 PROCEDURE — 77336 RADIATION PHYSICS CONSULT: CPT | Performed by: RADIOLOGY

## 2022-04-26 PROCEDURE — 77385 HC IMRT TREATMENT DELIVERY, SIMPLE: CPT | Performed by: RADIOLOGY

## 2022-04-26 PROCEDURE — 77427 RADIATION TX MANAGEMENT X5: CPT | Performed by: RADIOLOGY

## 2022-04-27 ENCOUNTER — RESULTS ONLY (OUTPATIENT)
Dept: RADIATION ONCOLOGY | Facility: HOSPITAL | Age: 77
End: 2022-04-27
Payer: MEDICARE

## 2022-04-27 ENCOUNTER — APPOINTMENT (OUTPATIENT)
Dept: RADIATION ONCOLOGY | Facility: HOSPITAL | Age: 77
End: 2022-04-27
Payer: MEDICARE

## 2022-04-27 ENCOUNTER — OFFICE VISIT (OUTPATIENT)
Dept: RADIATION ONCOLOGY | Facility: HOSPITAL | Age: 77
End: 2022-04-27
Payer: MEDICARE

## 2022-04-27 DIAGNOSIS — C61 PROSTATE CANCER (H): Primary | Chronic | ICD-10-CM

## 2022-04-27 LAB
RAD ONC ARIA COURSE ID: NORMAL
RAD ONC ARIA COURSE LAST TREATMENT DATE: NORMAL
RAD ONC ARIA COURSE START DATE: NORMAL
RAD ONC ARIA COURSE TREATMENT ELAPSED DAYS: 23
RAD ONC ARIA FIRST TREATMENT DATE: NORMAL
RAD ONC ARIA PLAN FRACTIONS TREATED TO DATE: 16
RAD ONC ARIA PLAN ID: NORMAL
RAD ONC ARIA PLAN NAME: NORMAL
RAD ONC ARIA PLAN PRESCRIBED DOSE PER FRACTION: 3 GY
RAD ONC ARIA PLAN TOTAL FRACTIONS PRESCRIBED: 20
RAD ONC ARIA PLAN TOTAL PRESCRIBED DOSE: 6000 CGY
RAD ONC ARIA REFERENCE POINT DOSAGE GIVEN TO DATE: NORMAL GY
RAD ONC ARIA REFERENCE POINT ID: NORMAL

## 2022-04-27 PROCEDURE — 77385 HC IMRT TREATMENT DELIVERY, SIMPLE: CPT | Performed by: RADIOLOGY

## 2022-04-27 PROCEDURE — 77014 PR CT GUIDE FOR PLACEMENT RADIATION THERAPY FIELDS: CPT | Mod: 26 | Performed by: RADIOLOGY

## 2022-04-27 ASSESSMENT — PAIN SCALES - GENERAL: PAINLEVEL: MODERATE PAIN (4)

## 2022-04-27 NOTE — PROGRESS NOTES
[facility]  DEPARTMENT OF RADIATION ONCOLOGY  ON TREATMENT VISIT (OTV) NOTE    Name: Clem Bishop MRN: [id number]   : 1945 (76 year old)  Date of Service: 2022 Referring: Dr. Cespedes       Diagnosis and Cancer Staging  Prostate cancer (H)  Staging form: Prostate, AJCC 8th Edition  - Clinical stage from 2022: Stage IIC (cT2a, cN0, cM0, PSA: 9.4, Grade Group: 4) - Signed by Ronald Carson MD on 2022      Radiation Therapy   Site: Prostate and pelvic nodes using volumetric-modulated arc therapy (VMAT) with simultaneous integrated boost (SIB) and androgen deprivation therapy (ADT).   Treatment to Date    Received 16 fraction(s) = 4800 cGy (at 300, 220 each)    Remaining 4 fraction(s)    Goal 20 fractions = 6000 cGy (6000, 4400)     Radiation therapy week 0 (fractions 1-5): Grade 0 toxicity due to radiation therapy (CTCAE 5.0).  Week 1 (-10): Grade 2 urinary toxicity (start ibuprofen).  Week 2 (11-15): Grade 2 urinary toxicity (ibuprofen).  Week 3 (16-20): Grade 2 urinary toxicity (ibuprofen).    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 In the setting of paraplegia and muscle wasting due to motor-neuron disease (neurosarcoidosis),, urology referred the patient for definitive radiation therapy with androgen deprivation therapy (ADT) with potentially curative intent (Primary: 87-mL prostate with up to [place] score 8 disease (GG4, 4+4) involving up to 40% of 12/12 cores. Nodes: N0 per CT. Metastasis: M0 per bone scan. Markers: PSA 9.38 on 10/19/2021). ADT started 2022  through urology (6-months course of therapy). 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 The patient relies on his wife for transportation. The effort required for transportation and his specialist van led the patient and his wife to defer radiation therapy until the spring. Baseline : Daytime x6-8, nocturia x1-2, 1 pad per day, occasional leakage, mild urgency and hesitation, requires oxybutynin for nocturia (AUA 11+2). Baseline GI: 1 soft bowel movement each morning.  01/06/2022 CT    (Please note that EMR interfaces between institutions can result in loss of embedded images.)    Recent History (SL)   Virtual video telemedicine visit assisted by patient and LPN ([place]).  General: Accompanied by wife. No new complaints. Still very pleased with treatment. Feels relatively well spite his chronic illnesses.Finds setup and treatment to be easier than he had anticipated. Dara lift for transfer from his wheelchair to the linac couch. No bowel or bladder preparation prior to daily radiation therapy. Have counseled that occasional missed treatment will not compromise the efficacy of therapy and will be made up at the end of the treatment schedule.   :  No additional flank pain attributable to a ureteral stone. Has left lateral flank ecchymosis attributed to trauma. Still has significant improvement daytime urination frequency after starting ibuprofen. Continues to take ibuprofen 2 times per day and sometimes before sleep. Urination (frequency, flow) remains improved from 14 times during the day to only 8-9 times during the day after starting ibuprofen. Stably improved after decreasing dose from 2 tablets to 1 tablet each dose. Wakes 1-2 times at night to urinate (takes ibuprofen at night for back pain). Feels that urine volume is back to  "\"normal\" (measures each void due to his collection device). Denies hematuria. Reports nocturia x1 when taking ibuprofen at night for back pain. Without ibuprofen, typically wakes 1-2 times per night to urinate. Reviewed medication list and comorbidities for ibuprofen use.  Index: Grade 2 toxicity versus nephroureterolithiasis.  Intervention: Counseled to taper ibuprofen to the lowest effective dose. Will  the patient to attempt to stop.  Impression: Tolerating taper of ibuprofen. Urinary changes have likely been due to radiation therapy vs. nephroureterolithiasis. Has elevated risk of mild to morbid urinary toxicity due to baseline dysfunction, prostate size, bladder wall abnormalities, general health, and functional status.  GI: Stable at baseline as above.  Index: Grade 0.  Intervention: Observe.  Impression: Possible changes within 1-2 weeks.  Fatigue: Stable at compromised baseline.  Index: Grade 0 due to radiation therapy.  Intervention: Observe.  Impression: Progressive changes possible within 1-2 weeks.  ID: Response to a question, counseled that radiation therapy is not a barrier for second COVID-19 booster. Daily COVID-19 screening negative for barriers to proceeding with radiation therapy.  Follow-up: After radiation therapy, refer back to urology for appropriate evaluation of additional ADT (currently, short-term ADT anticipated), PSA measurements, and testosterone levels as indicated to assist interpretation of PSA levels for post radiation diagnostic purposes    Prior Oncologic History   The patient's oncologic history across multiple institutions is abstracted as follows:    PSA History:  Date PSA   06/29/2020  7.71   09/30/2020  7.88   01/05/2021  8.20   03/17/2021  8.36   10/19/2021  9.38   12/20/2021  Biopsy   01/25/2022  ADT start     Diagnosis:    11/23/2021 Urology consultation: Referred for evaluation of rising PSA. Recommended biopsy.    12/20/2021 TRUS-guided sextant prostate biopsy: " Operative report described an uncomplicated procedure. Digital rectal examination noted a nodule near the left apex. Biopsy findings as above.    [place]2022 Abdomen/pelvis CT with with contrast: Enlarged prostate deeply invading the bladder base. Not clinically suspicious regional nodes or distant metastatic disease.    2022 Bone scan: Not clinically suspicious for metastatic disease. Sternal and left shoulder findings felt to be traumatic or degenerative in nature.    2022 Urology follow-up: Recommended referral for radiation therapy and discussion about the merits of adding ADT to radiation therapy versus radiation therapy alone. Family had declined ADT as monotherapy (non-curative) due to risks of worsening the patient's pre-existing muscle wasting. Among the other strategies discussed were deferral of initiation of ADT until the PSA rises significantly (i.e., greater than 20) or radical prostatectomy (not recommended due to significant comorbidities).    2020 Start of androgen deprivation therapy (ADT): Short-course of ADT as an adjunct to definitive pelvic radiation therapy.    2022 Placement of interstitial perirectal hydrogel and prostate fiducial markers (tentative date).    Chemotherapy History: None.  Radiation History: Yes (as above).  Implanted Cardiac Devices: None.  Implanted Chest Wall Infusion Port: None.    Past Medical History:   Diagnosis Date     Actinic keratosis      Broken arm     patient thinks it was his right arm.     Cerebrovascular accident (H) 2019    Acute left thalamaic, facial numbness, slurred speech     Chronic deep vein thrombosis (DVT) of left lower extremity, unspecified vein (H)      Diabetes mellitus without complication (H)      Elevated prostate specific antigen (PSA)     less than 10ng/ml     History of basal cell carcinoma      Hypertension      Hypertension, essential      Long term current use of anticoagulant therapy      Mixed  hyperlipidemia      Neurosarcoidosis 2005    Paraplegia since 2005     Paraplegia (H) 2005     Personal history of venous thrombosis and embolism     on warfarin     Prostate cancer (H)      Past Surgical History:   Procedure Laterality Date     COLONOSCOPY - HIM SCAN  04/19/2006    Colonoscopy at the [facility] 4/16/2006     EGD  04/20/2006     EXCISE LESION EAR EXTERNAL Left 06/23/2016    Procedure: EXCISE LESION EAR EXTERNAL;  Surgeon: Von Suresh DO;  Location: [facility] OR     FLEXIBLE SIGMOIDOSCOPY - HIM SCAN  03/24/2000    Flex Sigmoidoscopy - Hyperplastic polyp     SPINAL PUNCTURE,LUMBAR, DIAGNOSTIC  07/07/2005    diaphragm punctured     Outpatient Encounter Medications as of 4/27/2022   Medication Sig Dispense Refill     amLODIPine (NORVASC) 10 MG tablet Take 1 tablet (10 mg) by mouth daily 30 tablet 0     aspirin (ASA) 81 MG EC tablet Take 1 tablet (81 mg) by mouth daily 30 tablet 0     atenolol (TENORMIN) 50 MG tablet Take 50 mg by mouth daily       calcium carbonate 500 mg, elemental, (OSCAL 500) 1250 (500 Ca) MG TABS tablet Take by mouth 2 times daily 250 mg BID       glyBURIDE (DIABETA /MICRONASE) 5 MG tablet Take 10 mg by mouth 2 times daily (with meals)        ibuprofen (ADVIL,MOTRIN) 600 MG tablet Take 1 tablet (600 mg) by mouth every 6 hours as needed for pain 30 tablet 0     JANTOVEN ANTICOAGULANT 1 MG tablet 1mg 4 times per week, 1.5mg 3 times per week.  Every 6 week blood drawn.       losartan (COZAAR) 100 MG tablet Take 100 mg by mouth daily        METFORMIN HCL ER PO Take 1,000 mg by mouth 2 times daily       omeprazole (PRILOSEC) 20 MG DR capsule Take 40 mg by mouth every morning        ONETOUCH ULTRA test strip daily        oxybutynin (DITROPAN-XL) 5 MG 24 hr tablet Take 5 mg by mouth daily        simvastatin (ZOCOR) 10 MG tablet Take 1 tablet (10 mg) by mouth At Bedtime 30 tablet 0     triamcinolone (KENALOG) 0.1 % external cream        No facility-administered encounter medications on file  as of 4/27/2022.     Allergies/Reactions: Seasonal allergies    Orders via Epic EMR: No orders of the defined types were placed in this encounter.    Social History     Social History Narrative     with three children, wife is caregive. 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    Hobbies: Use to carve wood, use to draw, paint, used to do rock work & carpentry, able to carve walking sticks at times, hard with limited right hand function.     Socioeconomic History     Marital status:      Spouse name: Myranda     Number of children: 3     Highest education level: High school graduate   Occupational History     Occupation: Disabled:   Blaster     Employer: [employer]     Social History     Tobacco Use     Smoking status: Never Smoker     Smokeless tobacco: Never Used   Vaping Use     Vaping Use: Never used   Substance Use Topics     Alcohol use: Not Currently     Comment: once in a while has a glass of wine with wife     Drug use: Never     Family History   Problem Relation Age of Onset     Hypertension Mother      Cerebrovascular Disease Father      Emphysema Father      Breast Cancer Sister      Fibromyalgia Sister      Cerebrovascular Disease Sister      Brain Cancer Sister      Coronary Artery Disease Sister      Prostate Cancer Brother      Lupus Brother      Coronary Artery Disease Son    No  other cancers in first or second degree relatives.    Baseline Review of Systems (prior to radiation therapy; supplemental to the History)   ROS (score of 0 indicates that symptom is at baseline for most sections below): See Flowsheet for additional comments as indicated.  Data Unavailable due to chronic right shoulder pain    Patient Health Questionnaire-9 (PHQ-9)   No flowsheet data found.    Physical Exam   KPS: 60 (requires occasional assistance but cares for most personal needs).  ECOG Status: 3 (Capable of only limited self-care; needs help with ADLs; in bed/chair >50% of waking  hours)  Vitals: There were no vitals taken for this visit.  Wt Readings from Last 3 Encounters:   01/25/22 83.9 kg (185 lb)   01/17/22 83.9 kg (185 lb)   11/24/19 88.2 kg (194 lb 7.1 oz)     Clinical Examination (during the week, additional physician evaluations at linac with the staff)   General: Appears clinically/medically stable. Appears in fair general health. Overweight (BMI 25-29). Appears slightly fatigued. Appears stated age. Appears well-developed, well-nourished, and in no acute distress. Does not appear acutely or chronically ill. Appears well groomed.  Head: Natural alopecia pattern. Normocephalic.  Eyes: Glasses. Anicteric, vision intact.  ENT: Lips normal colored. Good volume. No hoarseness.  Neck: Symmetric, trachea midline.  Lymphatics: Deferred.  Chest/Breasts: No port. No implanted cardiac device.   Lungs: Easy respirations, no use of accessory muscles. Clear to auscultation.  Cardiovascular: No jugulovenous distension. No carotid pulsations. RRR, S1, S2.  Abdomen: No erythema. Nondistended..  Pelvis: Resolving ecchymosis of left lateral flank at site of reported trauma. No skin breakdown or cellulitis.  MSK: Wheelchair. Paraplegia. Thin calf muscles with good muscle tone. Good posture.  Integument: No jaundice or pallor.  Neurologic: Right-handed. Alert, oriented, fluent. Memory intact. Paraplegia with right-sided motor deficits (worsens distally).  Psychologic: Well-spoken about his cancer and therapy. Good dynamic with his wife. Motivated, upbeat, relaxed, confident, and engaging. Pleasant, cooperative, insightful, and concrete.    Physician Time on Day of Encounter, and [facility] Data Reviewed and Analyzed on Day of Encounter   Not applicable.    Physician Radiation Therapy Information Review   Radiation Oncology weekly review: Reviewed available labs and diagnostic studies. Interpreted as adequate to proceed with radiation therapy. Discussed management with Therapy, Treatment Planning, and Nursing.  Review or weekly routine QA, linac imaging, and clinical set up are adequate to proceed with radiation therapy as prescribed.    Physician Radiation Therapy Assessment   Routine tolerance to radiation therapy.    Physician Radiation Therapy Plan   No new radiation therapy interventions. No separate boost/cone down phase since the plan utilizes a simultaneous integrated boost (SIB). Have reviewed the graphical 3D plan with the patient and his wife with attention to dose to the prostate region, regional nodes, rectum, bladder, small bowel bowel, large bowel, hips, etc. Reviewed anticipated time course, nature, and potential interventions for managing toxicity during and after radiation therapy. Patient aware of onsite and telemedicine coverage of our clinic. He wished to proceed with treatment. All questions answered to the satisfaction of the patient and his wife.      Ronald Carson MD, PhD  Department of Radiation Oncology  Tel: [phone]  Fax: [phone]    Care Team:  Marysol Cespedes M.D. (urology)  Tayo Logan M.D. (neurology)  Anoop Bowling M.D. (medicine)

## 2022-04-28 ENCOUNTER — APPOINTMENT (OUTPATIENT)
Dept: RADIATION ONCOLOGY | Facility: HOSPITAL | Age: 77
End: 2022-04-28
Payer: MEDICARE

## 2022-04-28 ENCOUNTER — ALLIED HEALTH/NURSE VISIT (OUTPATIENT)
Dept: RADIATION ONCOLOGY | Facility: HOSPITAL | Age: 77
End: 2022-04-28
Payer: MEDICARE

## 2022-04-28 ENCOUNTER — RESULTS ONLY (OUTPATIENT)
Dept: RADIATION ONCOLOGY | Facility: HOSPITAL | Age: 77
End: 2022-04-28
Payer: MEDICARE

## 2022-04-28 DIAGNOSIS — C61 PROSTATE CA (H): Primary | ICD-10-CM

## 2022-04-28 LAB
RAD ONC ARIA COURSE ID: NORMAL
RAD ONC ARIA COURSE LAST TREATMENT DATE: NORMAL
RAD ONC ARIA COURSE START DATE: NORMAL
RAD ONC ARIA COURSE TREATMENT ELAPSED DAYS: 24
RAD ONC ARIA FIRST TREATMENT DATE: NORMAL
RAD ONC ARIA PLAN FRACTIONS TREATED TO DATE: 17
RAD ONC ARIA PLAN ID: NORMAL
RAD ONC ARIA PLAN NAME: NORMAL
RAD ONC ARIA PLAN PRESCRIBED DOSE PER FRACTION: 3 GY
RAD ONC ARIA PLAN TOTAL FRACTIONS PRESCRIBED: 20
RAD ONC ARIA PLAN TOTAL PRESCRIBED DOSE: 6000 CGY
RAD ONC ARIA REFERENCE POINT DOSAGE GIVEN TO DATE: NORMAL GY
RAD ONC ARIA REFERENCE POINT ID: NORMAL

## 2022-04-28 PROCEDURE — 77385 HC IMRT TREATMENT DELIVERY, SIMPLE: CPT | Performed by: RADIOLOGY

## 2022-04-28 PROCEDURE — 77014 PR CT GUIDE FOR PLACEMENT RADIATION THERAPY FIELDS: CPT | Mod: 26 | Performed by: RADIOLOGY

## 2022-04-29 ENCOUNTER — RESULTS ONLY (OUTPATIENT)
Dept: RADIATION ONCOLOGY | Facility: HOSPITAL | Age: 77
End: 2022-04-29
Payer: MEDICARE

## 2022-04-29 ENCOUNTER — APPOINTMENT (OUTPATIENT)
Dept: RADIATION ONCOLOGY | Facility: HOSPITAL | Age: 77
End: 2022-04-29
Payer: MEDICARE

## 2022-04-29 LAB
RAD ONC ARIA COURSE ID: NORMAL
RAD ONC ARIA COURSE LAST TREATMENT DATE: NORMAL
RAD ONC ARIA COURSE START DATE: NORMAL
RAD ONC ARIA COURSE TREATMENT ELAPSED DAYS: 25
RAD ONC ARIA FIRST TREATMENT DATE: NORMAL
RAD ONC ARIA PLAN FRACTIONS TREATED TO DATE: 18
RAD ONC ARIA PLAN ID: NORMAL
RAD ONC ARIA PLAN NAME: NORMAL
RAD ONC ARIA PLAN PRESCRIBED DOSE PER FRACTION: 3 GY
RAD ONC ARIA PLAN TOTAL FRACTIONS PRESCRIBED: 20
RAD ONC ARIA PLAN TOTAL PRESCRIBED DOSE: 6000 CGY
RAD ONC ARIA REFERENCE POINT DOSAGE GIVEN TO DATE: NORMAL GY
RAD ONC ARIA REFERENCE POINT ID: NORMAL

## 2022-04-29 PROCEDURE — 77427 RADIATION TX MANAGEMENT X5: CPT | Performed by: RADIOLOGY

## 2022-04-29 PROCEDURE — 77014 PR CT GUIDE FOR PLACEMENT RADIATION THERAPY FIELDS: CPT | Mod: 26 | Performed by: RADIOLOGY

## 2022-04-29 PROCEDURE — 77385 HC IMRT TREATMENT DELIVERY, SIMPLE: CPT | Performed by: RADIOLOGY

## 2022-05-02 ENCOUNTER — RESULTS ONLY (OUTPATIENT)
Dept: RADIATION ONCOLOGY | Facility: HOSPITAL | Age: 77
End: 2022-05-02
Payer: MEDICARE

## 2022-05-02 ENCOUNTER — APPOINTMENT (OUTPATIENT)
Dept: RADIATION ONCOLOGY | Facility: HOSPITAL | Age: 77
End: 2022-05-02
Attending: RADIOLOGY
Payer: MEDICARE

## 2022-05-02 LAB
RAD ONC ARIA COURSE ID: NORMAL
RAD ONC ARIA COURSE LAST TREATMENT DATE: NORMAL
RAD ONC ARIA COURSE START DATE: NORMAL
RAD ONC ARIA COURSE TREATMENT ELAPSED DAYS: 28
RAD ONC ARIA FIRST TREATMENT DATE: NORMAL
RAD ONC ARIA PLAN FRACTIONS TREATED TO DATE: 19
RAD ONC ARIA PLAN ID: NORMAL
RAD ONC ARIA PLAN NAME: NORMAL
RAD ONC ARIA PLAN PRESCRIBED DOSE PER FRACTION: 3 GY
RAD ONC ARIA PLAN TOTAL FRACTIONS PRESCRIBED: 20
RAD ONC ARIA PLAN TOTAL PRESCRIBED DOSE: 6000 CGY
RAD ONC ARIA REFERENCE POINT DOSAGE GIVEN TO DATE: NORMAL GY
RAD ONC ARIA REFERENCE POINT ID: NORMAL

## 2022-05-02 PROCEDURE — 77385 HC IMRT TREATMENT DELIVERY, SIMPLE: CPT | Performed by: RADIOLOGY

## 2022-05-02 PROCEDURE — 77014 HC CT GUIDE FOR PLACEMENT RADIATION THERAPY FIELDS: CPT | Performed by: RADIOLOGY

## 2022-05-02 PROCEDURE — 77014 PR CT GUIDE FOR PLACEMENT RADIATION THERAPY FIELDS: CPT | Mod: 26 | Performed by: RADIOLOGY

## 2022-05-03 ENCOUNTER — ALLIED HEALTH/NURSE VISIT (OUTPATIENT)
Dept: RADIATION ONCOLOGY | Facility: HOSPITAL | Age: 77
End: 2022-05-03
Payer: MEDICARE

## 2022-05-03 ENCOUNTER — DOCUMENTATION ONLY (OUTPATIENT)
Dept: RADIATION ONCOLOGY | Facility: HOSPITAL | Age: 77
End: 2022-05-03
Payer: MEDICARE

## 2022-05-03 ENCOUNTER — OFFICE VISIT (OUTPATIENT)
Dept: RADIATION ONCOLOGY | Facility: HOSPITAL | Age: 77
End: 2022-05-03
Payer: MEDICARE

## 2022-05-03 ENCOUNTER — RESULTS ONLY (OUTPATIENT)
Dept: RADIATION ONCOLOGY | Facility: HOSPITAL | Age: 77
End: 2022-05-03
Payer: MEDICARE

## 2022-05-03 DIAGNOSIS — C61 PROSTATE CANCER (H): Primary | ICD-10-CM

## 2022-05-03 DIAGNOSIS — C61 PROSTATE CANCER (H): Primary | Chronic | ICD-10-CM

## 2022-05-03 LAB
RAD ONC ARIA COURSE ID: NORMAL
RAD ONC ARIA COURSE LAST TREATMENT DATE: NORMAL
RAD ONC ARIA COURSE START DATE: NORMAL
RAD ONC ARIA COURSE TREATMENT ELAPSED DAYS: 29
RAD ONC ARIA FIRST TREATMENT DATE: NORMAL
RAD ONC ARIA PLAN FRACTIONS TREATED TO DATE: 20
RAD ONC ARIA PLAN ID: NORMAL
RAD ONC ARIA PLAN NAME: NORMAL
RAD ONC ARIA PLAN PRESCRIBED DOSE PER FRACTION: 3 GY
RAD ONC ARIA PLAN TOTAL FRACTIONS PRESCRIBED: 20
RAD ONC ARIA PLAN TOTAL PRESCRIBED DOSE: 6000 CGY
RAD ONC ARIA REFERENCE POINT DOSAGE GIVEN TO DATE: NORMAL GY
RAD ONC ARIA REFERENCE POINT ID: NORMAL

## 2022-05-03 PROCEDURE — 77385 HC IMRT TREATMENT DELIVERY, SIMPLE: CPT | Performed by: RADIOLOGY

## 2022-05-03 PROCEDURE — 77014 PR CT GUIDE FOR PLACEMENT RADIATION THERAPY FIELDS: CPT | Mod: 26 | Performed by: RADIOLOGY

## 2022-05-03 PROCEDURE — 77014 HC CT GUIDE FOR PLACEMENT RADIATION THERAPY FIELDS: CPT | Performed by: RADIOLOGY

## 2022-05-03 PROCEDURE — 77336 RADIATION PHYSICS CONSULT: CPT | Performed by: RADIOLOGY

## 2022-05-03 NOTE — PATIENT INSTRUCTIONS
We will contact you within the next few weeks for a brief telephone visit. It is important to keep all appointments. Please follow up with your primary care provider for health maintenance. Follow up with Dr. Cespedes (Urology) in August 2022, for a PSA lab draw.

## 2022-05-03 NOTE — PROGRESS NOTES
[facility]  DEPARTMENT OF RADIATION ONCOLOGY  ON TREATMENT VISIT (OTV) NOTE    Name: Clem Bishop MRN: [id number]   : 1945 (76 year old)  Date of Service: May 3, 2022 Referring: Dr. Cespedes       Diagnosis and Cancer Staging  Prostate cancer (H)  Staging form: Prostate, AJCC 8th Edition  - Clinical stage from 2022: Stage IIC (cT2a, cN0, cM0, PSA: 9.4, Grade Group: 4) - Signed by Ronald Carson MD on 2022      Radiation Therapy   Site: Prostate and pelvic nodes using volumetric-modulated arc therapy (VMAT) with simultaneous integrated boost (SIB) and androgen deprivation therapy (ADT).   Treatment to Date    Received 20 fraction(s) = 6000 cGy (at 300, 220 each)    Remaining 0 fraction(s)    Goal 20 fractions = 6000 cGy (6000, 4400)     Radiation therapy week 0 (fractions 1-5): Grade 0 toxicity due to radiation therapy (CTCAE 5.0).  Week 1 (-10): Grade 2 urinary toxicity (start ibuprofen).  Week 2 (11-15): Grade 2 urinary toxicity (ibuprofen).  Week 3 (16-20): Grade 0 urinary toxicity (no ibuprofen).    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 In the setting of paraplegia and muscle wasting due to motor-neuron disease (neurosarcoidosis),, urology referred the patient for definitive radiation therapy with androgen deprivation therapy (ADT) with potentially curative intent (Primary: 87-mL prostate with up to [place] score 8 disease (GG4, 4+4) involving up to 40% of 12/12 cores. Nodes: N0 per CT. Metastasis: M0 per bone scan. Markers: PSA 9.38 on 10/19/2021). ADT started 2022  through urology (6-months course of therapy). 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 The patient relies on his wife for transportation. The effort required for transportation and his specialist van led the patient and his wife to defer radiation therapy until the spring. Baseline : Daytime x6-8, nocturia x1-2, 1 pad per day, occasional leakage, mild urgency and hesitation, requires oxybutynin for nocturia (AUA 11+2). Baseline GI: 1 soft bowel movement each morning.  01/06/2022 CT    (Please note that EMR interfaces between institutions can result in loss of embedded images.)    Recent History (SL)   General: Seen at linac. Seen in clinic with his wife and nurse care coordinator. Both very pleased with his treatment. Urinary discomfort and function as below. Found setup and treatment to be easier than he had anticipated. Dara lift for transfer from his wheelchair to the linac couch. No bowel or bladder preparation prior to daily radiation therapy. Have counseled that occasional missed treatment will not compromise the efficacy of therapy and will be made up at the end of the treatment schedule.   :  Reports that urinary function remains at baseline after stopping ibuprofen. Urine volume also remains good. Reports that flank pain has not returned. Confirms that flank ecchymoses continues to improve after unknown trauma.  Index: Grade 0 toxicity due to radiation therapy.  Intervention: Observe.  Impression: Prior left flank pain and urinary frequency likely due to nephroureterolithiasis. Radiation therapy unlikely cause due to its improvement despite continuation of radiation therapy and discontinuation of ibuprofen.   GI: Stable at baseline as above.  Index: Grade 0.  Intervention: Observe.  Impression: Possible changes within 1-2  weeks.  Fatigue: Stable at compromised baseline.  Index: Grade 0 due to radiation therapy.  Intervention: Observe.  Impression: Progressive changes possible within 1-2 weeks.  ID: Response to a question, counseled that radiation therapy is not a barrier for second COVID-19 booster. Daily COVID-19 screening negative for barriers to proceeding with radiation therapy.  Follow-up: After radiation therapy, refer back to urology for evaluation of the merits of additional ADT (currently, short-term ADT anticipated), PSA measurements, and testosterone levels as indicated to assist interpretation of PSA levels for post radiation diagnostic purposes. Based on the schedule of urology, will determine when the patient returns to our clinic, perhaps within the next 3-6 months if urinary function remains stable.    Prior Oncologic History   The patient's oncologic history across multiple institutions is abstracted as follows:    PSA History:  Date PSA   2020  7.71   2020  7.88   2021  8.20   2021  8.36   10/19/2021  9.38   2021  Biopsy   2022  ADT start     Diagnosis:    2021 Urology consultation: Referred for evaluation of rising PSA. Recommended biopsy.    2021 TRUS-guided sextant prostate biopsy: Operative report described an uncomplicated procedure. Digital rectal examination noted a nodule near the left apex. Biopsy findings as above.    [place]2022 Abdomen/pelvis CT with with contrast: Enlarged prostate deeply invading the bladder base. Not clinically suspicious regional nodes or distant metastatic disease.    2022 Bone scan: Not clinically suspicious for metastatic disease. Sternal and left shoulder findings felt to be traumatic or degenerative in nature.    2022 Urology follow-up: Recommended referral for radiation therapy and discussion about the merits of adding ADT to radiation therapy versus radiation therapy alone. Family had declined ADT as  monotherapy (non-curative) due to risks of worsening the patient's pre-existing muscle wasting. Among the other strategies discussed were deferral of initiation of ADT until the PSA rises significantly (i.e., greater than 20) or radical prostatectomy (not recommended due to significant comorbidities).    01/25/2020 Start of androgen deprivation therapy (ADT): Short-course of ADT as an adjunct to definitive pelvic radiation therapy.    03/17/2022 Placement of interstitial perirectal hydrogel and prostate fiducial markers (tentative date).    Chemotherapy History: None.  Radiation History: Yes (as above).  Implanted Cardiac Devices: None.  Implanted Chest Wall Infusion Port: None.    Past Medical History:   Diagnosis Date     Actinic keratosis      Broken arm     patient thinks it was his right arm.     Cerebrovascular accident (H) 11/23/2019    Acute left thalamaic, facial numbness, slurred speech     Chronic deep vein thrombosis (DVT) of left lower extremity, unspecified vein (H)      Diabetes mellitus without complication (H)      Elevated prostate specific antigen (PSA)     less than 10ng/ml     History of basal cell carcinoma      Hypertension      Hypertension, essential      Long term current use of anticoagulant therapy      Mixed hyperlipidemia      Neurosarcoidosis 2005    Paraplegia since 2005     Paraplegia (H) 2005     Personal history of venous thrombosis and embolism     on warfarin     Prostate cancer (H)      Past Surgical History:   Procedure Laterality Date     COLONOSCOPY - HIM SCAN  04/19/2006    Colonoscopy at the [facility] 4/16/2006     EGD  04/20/2006     EXCISE LESION EAR EXTERNAL Left 06/23/2016    Procedure: EXCISE LESION EAR EXTERNAL;  Surgeon: Von Suresh DO;  Location: [facility] OR     FLEXIBLE SIGMOIDOSCOPY - HIM SCAN  03/24/2000    Flex Sigmoidoscopy - Hyperplastic polyp     SPINAL PUNCTURE,LUMBAR, DIAGNOSTIC  07/07/2005    diaphragm punctured     Outpatient Encounter Medications as of  5/3/2022   Medication Sig Dispense Refill     amLODIPine (NORVASC) 10 MG tablet Take 1 tablet (10 mg) by mouth daily 30 tablet 0     aspirin (ASA) 81 MG EC tablet Take 1 tablet (81 mg) by mouth daily 30 tablet 0     atenolol (TENORMIN) 50 MG tablet Take 50 mg by mouth daily       calcium carbonate 500 mg, elemental, (OSCAL 500) 1250 (500 Ca) MG TABS tablet Take by mouth 2 times daily 250 mg BID       glyBURIDE (DIABETA /MICRONASE) 5 MG tablet Take 10 mg by mouth 2 times daily (with meals)        ibuprofen (ADVIL,MOTRIN) 600 MG tablet Take 1 tablet (600 mg) by mouth every 6 hours as needed for pain 30 tablet 0     JANTOVEN ANTICOAGULANT 1 MG tablet 1mg 4 times per week, 1.5mg 3 times per week.  Every 6 week blood drawn.       losartan (COZAAR) 100 MG tablet Take 100 mg by mouth daily        METFORMIN HCL ER PO Take 1,000 mg by mouth 2 times daily       omeprazole (PRILOSEC) 20 MG DR capsule Take 40 mg by mouth every morning        ONETOUCH ULTRA test strip daily        oxybutynin (DITROPAN-XL) 5 MG 24 hr tablet Take 5 mg by mouth daily        simvastatin (ZOCOR) 10 MG tablet Take 1 tablet (10 mg) by mouth At Bedtime 30 tablet 0     triamcinolone (KENALOG) 0.1 % external cream        No facility-administered encounter medications on file as of 5/3/2022.     Allergies/Reactions: Seasonal allergies    Orders via Epic EMR: No orders of the defined types were placed in this encounter.    Social History     Social History Narrative     with three children, wife is caregive. 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    Hobbies: Use to carve wood, use to draw, paint, used to do rock work & carpentry, able to carve walking sticks at times, hard with limited right hand function.     Socioeconomic History     Marital status:      Spouse name: Myranda     Number of children: 3     Highest education level: High school graduate   Occupational History     Occupation: Disabled:   Blaster     Employer: [employer]     Social History     Tobacco Use     Smoking status: Never Smoker     Smokeless tobacco: Never Used   Vaping Use     Vaping Use: Never used   Substance Use Topics     Alcohol use: Not Currently     Comment: once in a while has a glass of wine with wife     Drug use: Never     Family History   Problem Relation Age of Onset     Hypertension Mother      Cerebrovascular Disease Father      Emphysema Father      Breast Cancer Sister      Fibromyalgia Sister      Cerebrovascular Disease Sister      Brain Cancer Sister      Coronary Artery Disease Sister      Prostate Cancer Brother      Lupus Brother      Coronary Artery Disease Son    No  other cancers in first or second degree relatives.    Baseline Review of Systems (prior to radiation therapy; supplemental to the History)   ROS (score of 0 indicates that symptom is at baseline for most sections below): See Flowsheet for additional comments as indicated.  Data Unavailable due to chronic right shoulder pain    Patient Health Questionnaire-9 (PHQ-9)   No flowsheet data found.    Physical Exam   KPS: 60 (requires occasional assistance but cares for most personal needs).  ECOG Status: 3 (Capable of only limited self-care; needs help with ADLs; in bed/chair >50% of waking hours)  Vitals: There were no vitals taken for this visit.  Wt Readings from Last 3 Encounters:   01/25/22 83.9 kg (185 lb)   01/17/22 83.9 kg (185 lb)   11/24/19 88.2 kg (194 lb 7.1 oz)     Clinical Examination (during the week, additional physician evaluations at [facility] with the staff)   General: Appears clinically/medically stable. Appears in fair general health. Overweight (BMI 25-29). Appears slightly fatigued. Appears stated age. Appears well-developed, well-nourished, and in no acute distress. Does not appear acutely or chronically ill. Appears well groomed.  Head: Natural alopecia pattern. Normocephalic.  Eyes: Glasses. Anicteric, vision intact.  ENT: Lips normal colored. Good volume.  No hoarseness.  Neck: Symmetric, trachea midline.  Lymphatics: Deferred.  Chest/Breasts: No port. No implanted cardiac device.   Lungs: Easy respirations, no use of accessory muscles.  Cardiovascular: No jugulovenous distension. No carotid pulsations.  Abdomen: Obesely distended. Otherwise, nondistended and nontender.  Pelvis: Resolving ecchymosis of left lateral flank at site of reported trauma. No skin breakdown or cellulitis. Obesely distended. Otherwise, nondistended, nontender..  MSK: Wheelchair. Paraplegia. Thin calf muscles with good muscle tone. Good posture.  Integument: No jaundice or pallor.  Neurologic: Right-handed. Alert, oriented, fluent. Memory intact. Paraplegia with right-sided motor deficits (worsens distally).  Psychologic: Well-spoken about his cancer and therapy. Good dynamic with his wife. Motivated, upbeat, relaxed, confident, and engaging. Pleasant, cooperative, insightful, and concrete.    Physician Time on Day of Encounter, and [facility] Data Reviewed and Analyzed on Day of Encounter   Not applicable.    Physician Radiation Therapy Information Review   Radiation Oncology weekly review: Reviewed available labs and diagnostic studies. Interpreted as adequate to proceed with radiation therapy. Discussed management with Therapy, Treatment Planning, and Nursing. Review or weekly routine QA, linac imaging, and clinical set up are adequate to proceed with radiation therapy as prescribed.    Physician Radiation Therapy Assessment   Routine tolerance to radiation therapy.    Physician Radiation Therapy Plan   No new radiation therapy interventions. No separate boost/cone down phase since the plan utilizes a simultaneous integrated boost (SIB). Reviewed the graphical 3D plan with the patient and his wife with attention to dose to the prostate region, regional nodes, rectum, bladder, small bowel bowel, large bowel, hips, etc. Reviewed anticipated time course, nature, and potential interventions for  managing toxicity during and after radiation therapy. Patient aware of onsite and telemedicine coverage of our clinic. He wished to proceed as recommended . All questions answered to the satisfaction of the patient and his wife.      Ronald Carson MD, PhD  Department of Radiation Oncology  Tel: [phone]  Fax: [phone]    Care Team:  Marysol Cespedes M.D. (urology)  Tayo Logan M.D. (neurology)  Anoop Bowling M.D. (medicine)

## 2022-05-03 NOTE — PROGRESS NOTES
[facility]  DEPARTMENT OF RADIATION ONCOLOGY  TREATMENT SUMMARY NOTE    Name: Clem Bishop MRN: [id number]   : 1945 (76 year old)  Date of Service: May 3, 2022 Referring: Dr. Cespedes       Diagnosis and Cancer Staging  Prostate cancer (H)  Staging form: Prostate, AJCC 8th Edition  - Clinical stage from 2022: Stage IIC (cT2a, cN0, cM0, PSA: 9.4, Grade Group: 4) - Signed by Ronald Carson MD on 2022      Radiation Therapy   Treatment site: Prostate and pelvic nodes with androgen deprivation therapy (ADT)..  Treatment intent: Curative.  Delivery method: Intensity modulated radiation therapy (IMRT) with simultaneous integrated boost (SIB) of the visble primary site and visible nodes.  Energy: 10 MV.  Dose: Pelvic nodes received 20 fractions of 220 cGy each for 4400 cGy white the prostate and seminal vesicles simultaneously received 300cGy each for 6000 cGy.  Dates: 2022-2022 (Elapsed days: 29).    Radiation therapy week 0 (fractions 1-5): Grade 0 toxicity due to radiation therapy (CTCAE 5.0).  Week 1 (06-10): Grade 2 urinary toxicity (start ibuprofen).  Week 2 (11-15): Grade 2 urinary toxicity (ibuprofen).  Week 3 (16-20): Grade 0 urinary toxicity (no ibuprofen).    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 Nodes: N0 per CT. Metastasis: M0 per bone scan. Markers: PSA 9.38 on 10/19/2021). ADT started 2022  through urology (6-months course of therapy). 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 The patient relies on his wife for transportation. The effort required for transportation and his specialist van led the patient and his wife to defer radiation therapy until the spring. Baseline : Daytime x6-8, nocturia x1-2, 1 pad per day, occasional leakage, mild urgency and hesitation, requires oxybutynin for nocturia (AUA 11+2). Baseline GI: 1 soft bowel movement each morning.  01/06/2022 CT    (Please note that EMR interfaces between institutions can result in loss of embedded images.)    Recent History (SL)   Today, this very pleasant 76 year old patient completed definitive radiation therapy with ADT for high-risk prostate cancer. Despite his chronic comorbidities and transportation challenges, the patient tolerated treatment much better than he and his wife had anticipated. During the early portion of radiation therapy, the patient developed left flank pain and urinary frequency. Ibuprofen was initiated and eventually tapered off as the flank pain and frequency improved. Urinary volume briefly diminished but also returned to its normal baseline. Based on the character and timing, the urinary changes were more likely due to transient passage of a nephroureteral stone then radiation therapy (the patient has a prior history of stones). Rectal function remain at his baseline. We will contact the patient in approximately 2 weeks for a routine check.    The patient has appointment to see his primary care service on 05/09/2022. We will notify urology that the patient has completed  radiation therapy so that the service can arrange follow-up to discuss the merits of continued androgen deprivation (short-term therapy versus long-term therapy) and laboratory monitoring (PSA and testosterone as indicated). Based on their schedule follow-up, we will arrange follow-up with my service, perhaps in 3-6 months if the patient remains stable. We counseled the patient that interpretation of the status of his prostate cancer will require that a PSA be drawn after cessation of androgen deprivation therapy and recovery of testosterone to non-castrate levels (until then, the lingering effects of ADT are anticipated to suppress PSA levels below measurable levels). The patient is wife wish proceed as recommended. We answered all questions to their satisfaction. Thanks allowing us to participate in the care of this very pleasant patient.    Prior Oncologic History   The patient's oncologic history across multiple institutions is abstracted as follows:    PSA History:  Date PSA   2020  7.71   2020  7.88   2021  8.20   2021  8.36   10/19/2021  9.38   2021  Biopsy   2022  ADT start     Diagnosis:    2021 Urology consultation: Referred for evaluation of rising PSA. Recommended biopsy.    2021 TRUS-guided sextant prostate biopsy: Operative report described an uncomplicated procedure. Digital rectal examination noted a nodule near the left apex. Biopsy findings as above.    [place]2022 Abdomen/pelvis CT with with contrast: Enlarged prostate deeply invading the bladder base. Not clinically suspicious regional nodes or distant metastatic disease.    2022 Bone scan: Not clinically suspicious for metastatic disease. Sternal and left shoulder findings felt to be traumatic or degenerative in nature.    2022 Urology follow-up: Recommended referral for radiation therapy and discussion about the merits of adding ADT to radiation therapy versus radiation  therapy alone. Family had declined ADT as monotherapy (non-curative) due to risks of worsening the patient's pre-existing muscle wasting. Among the other strategies discussed were deferral of initiation of ADT until the PSA rises significantly (i.e., greater than 20) or radical prostatectomy (not recommended due to significant comorbidities).    01/25/2020 Start of androgen deprivation therapy (ADT): Short-course of ADT as an adjunct to definitive pelvic radiation therapy.    03/17/2022 Placement of interstitial perirectal hydrogel and prostate fiducial markers (tentative date).    05/03/2022 Radiation therapy completion: As above.    Chemotherapy History: None.  Radiation History: Yes (as above).  Implanted Cardiac Devices: None.  Implanted Chest Wall Infusion Port: None.    Past Medical History:   Diagnosis Date     Actinic keratosis      Broken arm     patient thinks it was his right arm.     Cerebrovascular accident (H) 11/23/2019    Acute left thalamaic, facial numbness, slurred speech     Chronic deep vein thrombosis (DVT) of left lower extremity, unspecified vein (H)      Diabetes mellitus without complication (H)      Elevated prostate specific antigen (PSA)     less than 10ng/ml     History of basal cell carcinoma      Hypertension      Hypertension, essential      Long term current use of anticoagulant therapy      Mixed hyperlipidemia      Neurosarcoidosis 2005    Paraplegia since 2005     Paraplegia (H) 2005     Personal history of venous thrombosis and embolism     on warfarin     Prostate cancer (H)      Past Surgical History:   Procedure Laterality Date     COLONOSCOPY - HIM SCAN  04/19/2006    Colonoscopy at the [facility] 4/16/2006     EGD  04/20/2006     EXCISE LESION EAR EXTERNAL Left 06/23/2016    Procedure: EXCISE LESION EAR EXTERNAL;  Surgeon: Von Suresh DO;  Location: [facility] OR     FLEXIBLE SIGMOIDOSCOPY - HIM SCAN  03/24/2000    Flex Sigmoidoscopy - Hyperplastic polyp     SPINAL  PUNCTURE,LUMBAR, DIAGNOSTIC  07/07/2005    diaphragm punctured     Outpatient Encounter Medications as of 5/3/2022   Medication Sig Dispense Refill     amLODIPine (NORVASC) 10 MG tablet Take 1 tablet (10 mg) by mouth daily 30 tablet 0     aspirin (ASA) 81 MG EC tablet Take 1 tablet (81 mg) by mouth daily 30 tablet 0     atenolol (TENORMIN) 50 MG tablet Take 50 mg by mouth daily       calcium carbonate 500 mg, elemental, (OSCAL 500) 1250 (500 Ca) MG TABS tablet Take by mouth 2 times daily 250 mg BID       glyBURIDE (DIABETA /MICRONASE) 5 MG tablet Take 10 mg by mouth 2 times daily (with meals)        ibuprofen (ADVIL,MOTRIN) 600 MG tablet Take 1 tablet (600 mg) by mouth every 6 hours as needed for pain 30 tablet 0     JANTOVEN ANTICOAGULANT 1 MG tablet 1mg 4 times per week, 1.5mg 3 times per week.  Every 6 week blood drawn.       losartan (COZAAR) 100 MG tablet Take 100 mg by mouth daily        METFORMIN HCL ER PO Take 1,000 mg by mouth 2 times daily       omeprazole (PRILOSEC) 20 MG DR capsule Take 40 mg by mouth every morning        ONETOUCH ULTRA test strip daily        oxybutynin (DITROPAN-XL) 5 MG 24 hr tablet Take 5 mg by mouth daily        simvastatin (ZOCOR) 10 MG tablet Take 1 tablet (10 mg) by mouth At Bedtime 30 tablet 0     triamcinolone (KENALOG) 0.1 % external cream        No facility-administered encounter medications on file as of 5/3/2022.     Allergies/Reactions: Seasonal allergies    Orders via Epic EMR: No orders of the defined types were placed in this encounter.    Social History     Social History Narrative     with three children, wife is caregive. 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    Hobbies: Use to carve wood, use to draw, paint, used to do rock work & carpentry, able to carve walking sticks at times, hard with limited right hand function.     Socioeconomic History     Marital status:      Spouse name: Myranda     Number of children: 3     Highest education level:  High school graduate   Occupational History     Occupation: Disabled:   Blaster     Employer: [employer]     Social History     Tobacco Use     Smoking status: Never Smoker     Smokeless tobacco: Never Used   Vaping Use     Vaping Use: Never used   Substance Use Topics     Alcohol use: Not Currently     Comment: once in a while has a glass of wine with wife     Drug use: Never     Family History   Problem Relation Age of Onset     Hypertension Mother      Cerebrovascular Disease Father      Emphysema Father      Breast Cancer Sister      Fibromyalgia Sister      Cerebrovascular Disease Sister      Brain Cancer Sister      Coronary Artery Disease Sister      Prostate Cancer Brother      Lupus Brother      Coronary Artery Disease Son    No  other cancers in first or second degree relatives.    Baseline Review of Systems (prior to radiation therapy; supplemental to the History)   ROS (score of 0 indicates that symptom is at baseline for most sections below): See Flowsheet for additional comments as indicated.  Data Unavailable due to chronic right shoulder pain    Patient Health Questionnaire-9 (PHQ-9)   No flowsheet data found.    Physical Exam   KPS: 60 (requires occasional assistance but cares for most personal needs).  ECOG Status: 3 (Capable of only limited self-care; needs help with ADLs; in bed/chair >50% of waking hours)  Vitals: There were no vitals taken for this visit.  Wt Readings from Last 3 Encounters:   01/25/22 83.9 kg (185 lb)   01/17/22 83.9 kg (185 lb)   11/24/19 88.2 kg (194 lb 7.1 oz)     Clinical Examination (during the week, additional physician evaluations at [facility] with the staff)   General: Appears clinically/medically stable. Appears in fair general health. Overweight (BMI 25-29). Appears slightly fatigued. Appears stated age. Appears well-developed, well-nourished, and in no acute distress. Does not appear acutely or chronically ill. Appears well groomed.  Head: Natural alopecia  pattern. Normocephalic.  Eyes: Glasses. Anicteric, vision intact.  ENT: Lips normal colored. Good volume. No hoarseness.  Neck: Symmetric, trachea midline.  Lymphatics: Deferred.  Chest/Breasts: No port. No implanted cardiac device.   Lungs: Easy respirations, no use of accessory muscles.  Cardiovascular: No jugulovenous distension. No carotid pulsations.  Abdomen: Obesely distended. Otherwise, nondistended and nontender.  Pelvis: Resolving ecchymosis of left lateral flank at site of reported trauma. No skin breakdown or cellulitis. Obesely distended. Otherwise, nondistended, nontender..  MSK: Wheelchair. Paraplegia. Thin calf muscles with good muscle tone. Good posture.  Integument: No jaundice or pallor.  Neurologic: Right-handed. Alert, oriented, fluent. Memory intact. Paraplegia with right-sided motor deficits (worsens distally).  Psychologic: Well-spoken about his cancer and therapy. Good dynamic with his wife. Motivated, upbeat, relaxed, confident, and engaging. Pleasant, cooperative, insightful, and concrete.    Physician Time on Day of Encounter, and [facility] Data Reviewed and Analyzed on Day of Encounter   As above.    Physician Radiation Therapy Information Review   As above.    Physician Radiation Therapy Assessment   As above.    Physician Radiation Therapy Plan   As above.      Ronald Carson MD, PhD  Department of Radiation Oncology  Tel: [phone]  Fax: [phone]    Care Team:  Marysol Cespedes M.D. (urology)  Tayo Logan M.D. (neurology)  Anoop Bowling M.D. (medicine)

## 2022-05-09 ENCOUNTER — IMMUNIZATION (OUTPATIENT)
Dept: FAMILY MEDICINE | Facility: OTHER | Age: 77
End: 2022-05-09
Attending: FAMILY MEDICINE
Payer: MEDICARE

## 2022-05-09 PROCEDURE — 91305 COVID-19,PF,PFIZER (12+ YRS): CPT

## 2022-05-14 ENCOUNTER — HEALTH MAINTENANCE LETTER (OUTPATIENT)
Age: 77
End: 2022-05-14

## 2022-05-17 ENCOUNTER — TELEPHONE (OUTPATIENT)
Dept: RADIATION ONCOLOGY | Facility: HOSPITAL | Age: 77
End: 2022-05-17
Payer: MEDICARE

## 2022-05-17 NOTE — TELEPHONE ENCOUNTER
"Follow up post radiation  Two week follow up call made to patient post radiation therapy for prostate cancer.   Clem, reports he is doing \"so, so\", rates his pain 2/10, has slight burning with urination, treats with Ibuprofen. He feels like his urinary function is getting back to normal.  Denies hematuria, no abdominal cramping.  He is unsure of how many times he urinates throughout the day stated, \"to hard to keep track of\".  Nocturia 1-2 times per night.  Bowel movements seem to have improved since radiation treatment.  He has been drinking prune juice to avoid constipation.  Eating a regular diet, no nausea/vomiting. No fevers/chills. Patient advised will follow up in a couple weeks via phone.  No further questions or concerns at this time.     "

## 2022-05-30 ENCOUNTER — HOSPITAL ENCOUNTER (EMERGENCY)
Facility: HOSPITAL | Age: 77
Discharge: HOME | End: 2022-05-30
Attending: EMERGENCY MEDICINE | Admitting: EMERGENCY MEDICINE
Payer: MEDICARE

## 2022-05-30 VITALS
RESPIRATION RATE: 16 BRPM | OXYGEN SATURATION: 97 % | HEART RATE: 61 BPM | TEMPERATURE: 98.6 F | SYSTOLIC BLOOD PRESSURE: 152 MMHG | DIASTOLIC BLOOD PRESSURE: 72 MMHG

## 2022-05-30 DIAGNOSIS — S61.011A LACERATION OF RIGHT THUMB WITHOUT FOREIGN BODY WITHOUT DAMAGE TO NAIL, INITIAL ENCOUNTER: ICD-10-CM

## 2022-05-30 PROCEDURE — 250N000011 HC RX IP 250 OP 636: Performed by: EMERGENCY MEDICINE

## 2022-05-30 PROCEDURE — 12001 RPR S/N/AX/GEN/TRNK 2.5CM/<: CPT

## 2022-05-30 PROCEDURE — 12001 RPR S/N/AX/GEN/TRNK 2.5CM/<: CPT | Performed by: EMERGENCY MEDICINE

## 2022-05-30 PROCEDURE — 90715 TDAP VACCINE 7 YRS/> IM: CPT | Performed by: EMERGENCY MEDICINE

## 2022-05-30 PROCEDURE — 90471 IMMUNIZATION ADMIN: CPT | Performed by: EMERGENCY MEDICINE

## 2022-05-30 PROCEDURE — 99282 EMERGENCY DEPT VISIT SF MDM: CPT | Mod: 25

## 2022-05-30 RX ADMIN — CLOSTRIDIUM TETANI TOXOID ANTIGEN (FORMALDEHYDE INACTIVATED), CORYNEBACTERIUM DIPHTHERIAE TOXOID ANTIGEN (FORMALDEHYDE INACTIVATED), BORDETELLA PERTUSSIS TOXOID ANTIGEN (GLUTARALDEHYDE INACTIVATED), BORDETELLA PERTUSSIS FILAMENTOUS HEMAGGLUTININ ANTIGEN (FORMALDEHYDE INACTIVATED), BORDETELLA PERTUSSIS PERTACTIN ANTIGEN, AND BORDETELLA PERTUSSIS FIMBRIAE 2/3 ANTIGEN 0.5 ML: 5; 2; 2.5; 5; 3; 5 INJECTION, SUSPENSION INTRAMUSCULAR at 09:21

## 2022-05-30 ASSESSMENT — ENCOUNTER SYMPTOMS
EYES NEGATIVE: 1
RESPIRATORY NEGATIVE: 1
CARDIOVASCULAR NEGATIVE: 1
GASTROINTESTINAL NEGATIVE: 1
CONSTITUTIONAL NEGATIVE: 1
WOUND: 1

## 2022-05-30 NOTE — ED PROVIDER NOTES
History     Chief Complaint   Patient presents with     Laceration     HPI  Clem Bishop is a 76 year old male who comes to the emergency department complaining of a right thumb laceration.  The patient accidentally lacerated his thumb with a knife last evening.  He applied a dressing and was able to control the bleeding.  Unfortunately the patient is on Coumadin and this morning the wound began to bleed again.  He and his family applied pressure dressing but he still bleeding.  He states he is able to move his thumb without difficulty.  He denies numbness or tingling in the digit and he denies other injury at this time.  He is due for a tetanus immunization.    Allergies:  Allergies   Allergen Reactions     Seasonal Allergies      Not specified. (Found on problem list on inside front cover.)       Problem List:    Patient Active Problem List    Diagnosis Date Noted     Prostate cancer (H) 01/25/2022     Priority: Medium     Slurred speech 11/23/2019     Priority: Medium        Past Medical History:    Past Medical History:   Diagnosis Date     Actinic keratosis      Broken arm      Cerebrovascular accident (H) 11/23/2019     Chronic deep vein thrombosis (DVT) of left lower extremity, unspecified vein (H)      Diabetes mellitus without complication (H)      Elevated prostate specific antigen (PSA)      History of basal cell carcinoma      Hypertension      Hypertension, essential      Long term current use of anticoagulant therapy      Mixed hyperlipidemia      Neurosarcoidosis 2005     Paraplegia (H) 2005     Personal history of venous thrombosis and embolism      Prostate cancer (H)        Past Surgical History:    Past Surgical History:   Procedure Laterality Date     COLONOSCOPY - HIM SCAN  04/19/2006    Colonoscopy at the [facility] 4/16/2006     EGD  04/20/2006     EXCISE LESION EAR EXTERNAL Left 06/23/2016    Procedure: EXCISE LESION EAR EXTERNAL;  Surgeon: Von Suresh DO;  Location: [facility]      FLEXIBLE SIGMOIDOSCOPY - HIM SCAN  03/24/2000    Flex Sigmoidoscopy - Hyperplastic polyp     SPINAL PUNCTURE,LUMBAR, DIAGNOSTIC  07/07/2005    diaphragm punctured       Family History:    Family History   Problem Relation Age of Onset     Hypertension Mother      Cerebrovascular Disease Father      Emphysema Father      Breast Cancer Sister      Fibromyalgia Sister      Cerebrovascular Disease Sister      Brain Cancer Sister      Coronary Artery Disease Sister      Prostate Cancer Brother      Lupus Brother      Coronary Artery Disease Son        Social History:  Marital Status:   [2]  Social History     Tobacco Use     Smoking status: Never Smoker     Smokeless tobacco: Never Used   Vaping Use     Vaping Use: Never used   Substance Use Topics     Alcohol use: Not Currently     Comment: once in a while has a glass of wine with wife     Drug use: Never        Medications:    aspirin (ASA) 81 MG EC tablet  JANTOVEN ANTICOAGULANT 1 MG tablet  amLODIPine (NORVASC) 10 MG tablet  atenolol (TENORMIN) 50 MG tablet  calcium carbonate 500 mg, elemental, (OSCAL 500) 1250 (500 Ca) MG TABS tablet  glyBURIDE (DIABETA /MICRONASE) 5 MG tablet  ibuprofen (ADVIL,MOTRIN) 600 MG tablet  losartan (COZAAR) 100 MG tablet  METFORMIN HCL ER PO  omeprazole (PRILOSEC) 20 MG DR capsule  ONETOUCH ULTRA test strip  oxybutynin (DITROPAN-XL) 5 MG 24 hr tablet  simvastatin (ZOCOR) 10 MG tablet  triamcinolone (KENALOG) 0.1 % external cream          Review of Systems   Constitutional: Negative.    HENT: Negative.    Eyes: Negative.    Respiratory: Negative.    Cardiovascular: Negative.    Gastrointestinal: Negative.    Genitourinary: Negative.    Skin: Positive for wound.   All other systems reviewed and found unremarkable    Physical Exam   BP: 173/76  Pulse: 65  Resp: 16  SpO2: 98 %      Physical Exam 76-year-old gentleman is awake alert very pleasant and cooperative with my exam.  Examination of his right upper extremity reveals range of  motion of the elbow wrist and digits of the hand.  Peripheral pulses intact cap refill brisk no sensory deficits appreciated patient has a 2 cm full-thickness laceration on the palmar aspect of the base of his thumb.  The wound edges are sharp.  Wound is not grossly contaminated.  No evidence of tendon or neurovascular injury.    ED Course                 [facility]    -Laceration Repair    Date/Time: 5/30/2022 9:16 AM  Performed by: Arnaldo Alaniz DO  Authorized by: Arnaldo Alaniz DO     Risks, benefits and alternatives discussed.      ANESTHESIA (see MAR for exact dosages):     Anesthesia method:  Local infiltration    Local anesthetic:  Lidocaine 2% WITH epi  LACERATION DETAILS     Location:  Finger    Finger location:  R thumb    Length (cm):  2    REPAIR TYPE:     Repair type:  Simple      EXPLORATION:     Hemostasis achieved with:  Epinephrine    Wound exploration: wound explored through full range of motion and entire depth of wound probed and visualized      Wound extent: no nerve damage and no tendon damage      Contaminated: no      TREATMENT:     Area cleansed with:  Saline    Amount of cleaning:  Standard    Visualized foreign bodies/material removed: no      SKIN REPAIR     Repair method:  Sutures    Suture size:  4-0    Suture material:  Nylon    Number of sutures:  6    APPROXIMATION     Approximation:  Close    POST-PROCEDURE DETAILS     Dressing:  Antibiotic ointment and adhesive bandage        PROCEDURE    Patient Tolerance:  Patient tolerated the procedure well with no immediate complications                      No results found for this or any previous visit (from the past 24 hour(s)).    Medications   Tdap (tetanus-diphtheria-acell pertussis) (ADACEL) injection 0.5 mL (has no administration in time range)       Assessments & Plan (with Medical Decision Making)     I have reviewed the nursing notes.    I have reviewed the findings, diagnosis, plan and need for follow  up with the patient.      New Prescriptions    No medications on file       Final diagnoses:   Laceration of right thumb without foreign body without damage to nail, initial encounter       5/30/2022   [facility] EM[facility]     Arnaldo Alaniz,   05/30/22 0949

## 2022-05-30 NOTE — ED TRIAGE NOTES
Patient presents with his wife with complaints of a laceration to his right thumb pad.  Patient notes that he cut it on a knife last night; was able to wrap it and seemed to stop bleeding; this AM while taking the bandage off started to bleed again; on blood thinners.  Wound actively bleeding through dressing at this time.       Diagnosis (Required): Psoriasis Skyrizi Monitoring Guidelines: A yearly test for tuberculosis is required while taking Skyrizi. Skyrizi Dosing: 150 mg SC week 0 and week 4 then every 12 weeks thereafter Is Cyclosporine Contraindicated?: No Pregnancy And Lactation Warning Text: The risk during pregnancy and breastfeeding is uncertain with this medication. Detail Level: Zone

## 2022-06-01 ENCOUNTER — TELEPHONE (OUTPATIENT)
Dept: RADIATION ONCOLOGY | Facility: HOSPITAL | Age: 77
End: 2022-06-01
Payer: MEDICARE

## 2022-06-01 NOTE — TELEPHONE ENCOUNTER
Telephone call made to patient 4 weeks post radiation therapy to the prostate. He reports over all doing well with exception to a recent accident where he was cleaning some knives that unfortunately fell and cut his fingers. He has been treated for this is and is doing better. In regard to his prostate treatment, he explains decreased frequency, that his urinary system is retuning to normal, and nocturia is down to about one time per night. He is over all pleased and thinks he is healing well. He denies fever or chills. He has plans for family to come and visit soon and is looking forward to this. He denies further questions or concerns.

## 2022-09-04 ENCOUNTER — HEALTH MAINTENANCE LETTER (OUTPATIENT)
Age: 77
End: 2022-09-04

## 2023-06-03 ENCOUNTER — HEALTH MAINTENANCE LETTER (OUTPATIENT)
Age: 78
End: 2023-06-03

## 2023-10-10 ENCOUNTER — OFFICE VISIT (OUTPATIENT)
Dept: PODIATRY | Facility: OTHER | Age: 78
End: 2023-10-10
Attending: PODIATRIST
Payer: COMMERCIAL

## 2023-10-10 VITALS
OXYGEN SATURATION: 98 % | SYSTOLIC BLOOD PRESSURE: 191 MMHG | DIASTOLIC BLOOD PRESSURE: 88 MMHG | HEART RATE: 66 BPM | TEMPERATURE: 96.7 F

## 2023-10-10 DIAGNOSIS — L97.521 DIABETIC ULCER OF TOE OF LEFT FOOT ASSOCIATED WITH TYPE 2 DIABETES MELLITUS, LIMITED TO BREAKDOWN OF SKIN (H): ICD-10-CM

## 2023-10-10 DIAGNOSIS — E11.621 DIABETIC ULCER OF TOE OF LEFT FOOT ASSOCIATED WITH TYPE 2 DIABETES MELLITUS, LIMITED TO BREAKDOWN OF SKIN (H): ICD-10-CM

## 2023-10-10 DIAGNOSIS — I89.0 LYMPHEDEMA: Primary | ICD-10-CM

## 2023-10-10 DIAGNOSIS — E11.9 DIABETES MELLITUS TYPE 2, NONINSULIN DEPENDENT (H): ICD-10-CM

## 2023-10-10 DIAGNOSIS — L60.3 ONYCHODYSTROPHY: ICD-10-CM

## 2023-10-10 PROCEDURE — G0463 HOSPITAL OUTPT CLINIC VISIT: HCPCS

## 2023-10-10 PROCEDURE — 99203 OFFICE O/P NEW LOW 30 MIN: CPT | Performed by: PODIATRIST

## 2023-10-10 ASSESSMENT — PAIN SCALES - GENERAL: PAINLEVEL: MODERATE PAIN (4)

## 2023-10-10 NOTE — PROGRESS NOTES
Chief complaint: Patient presents with:  Musculoskeletal Problem: Left second toe pain      History of Present Illness: This 78 year old NIDDM II male is seen at the request of No ref. provider found for evaluation and suggestions of management of thickening of the skin of the toes.    Patient is wheelchair bound and he has had diminished sensation tot he feet and legs for 18 years. The swelling in his legs has worsened for the past two  years. Over the past week, he has been elevating his feet more which is improving the swelling.     His wife says he developed a wound on his LEFT distal second toe and the medial third toe. He has been trying to air out the toes so the medial third toe wound healed, but he still has a small draining wound on the second toe that has not healed.    Patient also says he wears 30-40mmHg compression socks daily. He has difficulty finding shoes that fit his feet because of moderate edema in his bilateral foot.    Patient's wife trims his toenails and she trimmed them recently, but he and his wife are inquiring about future toenail debridement.    No further pedal complaints today.       BP (!) 191/88 (BP Location: Left arm, Patient Position: Sitting, Cuff Size: Adult Regular)   Pulse 66   Temp (!) 96.7  F (35.9  C) (Tympanic)   SpO2 98%     Patient Active Problem List   Diagnosis    Slurred speech    Prostate cancer (H)       Past Surgical History:   Procedure Laterality Date    COLONOSCOPY - HIM SCAN  04/19/2006    Colonoscopy at the [facility] 4/16/2006    EGD  04/20/2006    EXCISE LESION EAR EXTERNAL Left 06/23/2016    Procedure: EXCISE LESION EAR EXTERNAL;  Surgeon: Von Suresh DO;  Location: [facility] OR    FLEXIBLE SIGMOIDOSCOPY - HIM SCAN  03/24/2000    Flex Sigmoidoscopy - Hyperplastic polyp    SPINAL PUNCTURE,LUMBAR, DIAGNOSTIC  07/07/2005    diaphragm punctured       Current Outpatient Medications   Medication    amLODIPine (NORVASC) 10 MG tablet    aspirin (ASA) 81 MG EC  tablet    atenolol (TENORMIN) 50 MG tablet    calcium carbonate 500 mg, elemental, (OSCAL 500) 1250 (500 Ca) MG TABS tablet    glyBURIDE (DIABETA /MICRONASE) 5 MG tablet    JANTOVEN ANTICOAGULANT 1 MG tablet    losartan (COZAAR) 100 MG tablet    METFORMIN HCL ER PO    omeprazole (PRILOSEC) 20 MG DR capsule    ONETOUCH ULTRA test strip    oxybutynin (DITROPAN-XL) 5 MG 24 hr tablet    simvastatin (ZOCOR) 10 MG tablet    ibuprofen (ADVIL,MOTRIN) 600 MG tablet    triamcinolone (KENALOG) 0.1 % external cream     No current facility-administered medications for this visit.          Allergies   Allergen Reactions    Seasonal Allergies      Not specified. (Found on problem list on inside front cover.)       Family History   Problem Relation Age of Onset    Hypertension Mother     Cerebrovascular Disease Father     Emphysema Father     Breast Cancer Sister     Fibromyalgia Sister     Cerebrovascular Disease Sister     Brain Cancer Sister     Coronary Artery Disease Sister     Prostate Cancer Brother     Lupus Brother     Coronary Artery Disease Son        Social History     Socioeconomic History    Marital status:      Spouse name: Myranda    Number of children: 3    Years of education: None    Highest education level: High school graduate   Occupational History    Occupation: Disabled:   Blaster     Employer: [employer]   Tobacco Use    Smoking status: Never    Smokeless tobacco: Never   Vaping Use    Vaping Use: Never used   Substance and Sexual Activity    Alcohol use: Not Currently     Comment: once in a while has a glass of wine with wife    Drug use: Never    Sexual activity: Not Currently   Social History Narrative     with three children, wife is caregive. 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    Hobbies: Use to carve wood, use to draw, paint, used to do rock work & carpentry, able to carve walking sticks at times, hard with limited right hand function.       ROS: 10 point ROS neg  other than the symptoms noted above in the HPI.  EXAM  Constitutional: healthy, alert, and no distress    Psychiatric: mentation appears normal and affect normal/bright    VASCULAR:  -Dorsalis pedis pulse +0/4 b/l secondary to edema  ---Biphasic on doppler bilaterally on 10/10/2023  -Posterior tibial pulse +2/4 b/l secondary to edema  ---Monophasic on doppler bilaterally on 10/10/2023  -Capillary refill time < 3 seconds to b/l hallux  -Hair growth absent to b/l anterior legs and ankles  -Varicosities and telangiectasias to foot, bilaterally   -Moderate 3+ pitting edema to foot and ankle, bilaterally   NEURO:  -Light touch sensation intact to b/l plantar forefoot  DERM:  -Skin temperature within normal limits to bilateral foot  -No maceration between the toes  -Toenails normal length but thickened, dystrophic and discolored x 10  -Significant edema with diffuse pitting of skin to bilateral foot (most prominent to the toes)  Wound Location:  LEFT distal lateral second toe  10/10/2023  Measurement:  0.3cm x 0.3cm x superficial through skin layer only  Drainage:  Mild serous  Odor:  None  Undermining:  None  Edges:  Intact  Base:  100% viable  Surrounding Skin: Intact  No severe erythema, no ascending erythema, no calor, no purulence, no malodor, no other signs of infection.     MSK:  -Moderate flexibility to all MTPJs of toes (toes are not rigidly contracted and stuck together)  -Muscle strength of ankles +5/5 for dorsiflexion, plantarflexion, ABDUction and ADDuction b/l    ============================================================    ASSESSMENT:  (I89.0) Lymphedema  (primary encounter diagnosis)    (E11.621,  L97.521) Diabetic ulcer of toe of left foot associated with type 2 diabetes mellitus, limited to breakdown of skin (H)    (L60.3) Onychodystrophy    (E11.9) Diabetes mellitus type 2, noninsulin dependent (H)        PLAN:  -Patient evaluated and examined. Treatment options discussed with no educational barriers  noted.    Lower extremity edema:  -Discussed LOWER EXTREMITY edema including etiology and treatment options.  -Patient has multiple telangiectasias and varicosities. This usually indicates incompetence of the valves in the veins that help push blood flow back to the heart. Blood pools in the legs and causes inflammation.   -Blood pooling can can brown discoloration to the foot, ankle or leg (hemosiderin deposition).  -Chronic LOWER EXTREMITY edema can cause pain or open ulcers.  -Treatment consists of compression socks and elevation of the legs above the level of the heart. Treatment of the inflammation is like wearing glasses in that it's not reversing the problem that is causing the edema, but it is decreasing the inflammation during the time of treatment.  -Patient advised to avoid idle standing and attempt to keep moving when standing. The muscles in the legs act as natural pumps to promote blood flow back to the heart.  -When sitting, attempt to elevate legs above the level of the heart.  -No additional elevation is required when sleeping flat on a bed. Compression socks should also be removed at night when lying down flat on a bed.  ---If edema worsens and/or starts to cause consistent wounds of the foot/leg, will consider a lymphedema pump. Patient has an elephantitis type appearance to the toes, but it has not progressed proximal to the toes. Besides the superficial wound on the LEFT second toe, the skin is intact without maceration the toes. Patient's wife advised to dry well between the toes every today.    ------------------------------------------------------------------    -Patient has dystrophic toenails. His wife recently trimmed them but they have difficulty trimming his toenails. He has requested for the toenails to be trimmed at his follow-up appointment.    ------------------------------------------------------------------      Diabetic foot ulcer:   -Explained etiology and treatment options for  diabetic foot ulcers.  -Diabetic foot ulcers usually develop because of increased pressure from a prominent area of the foot. The patient has loss of sensation in the foot, so the patient continued to walk on the foot without feeling pain from the pressure. The continued pressure created a wound on the bottom surface of the foot.  -The wound bed is healthy and not in need of debridement today.  -Applied Aquacel and gauze to the toes. Patient to keep gauze between the toe to keep the interspaces dry. He may also dry foot powder between the toes (after the wound is healed) in attempt to further keep the interspaces dry. Change the dressing every 2-3 days until the wound is healed with no further drainage.  -Patient was instructed to look for signs of infection (redness, swelling, pain, purulence, fever, chills, nausea, vomiting) and to return to podiatry or the emergency department immediately if there are any signs of infection.     Patient's DM appears to be stable and is managed by his PCP.    Total time spent preparing to see the patient, review of chart, obtaining history and physical examination, review of treatment options, education, discussion with patient and documenting in Epic / EMR was 35 minutes. All time involved was spent on the day of service for the patient (the same day as the patient's appointment).    -Patient in agreement with the above treatment plan and all of patient's questions were answered.      Return to clinic 63+ days for a diabetic foot exam and high risk nail debridement -- sooner if the toe wound is not healed / worsens / starts to show signs of infection within the next couple of weeks        Maryam Chairez DPM

## 2023-12-10 ENCOUNTER — HEALTH MAINTENANCE LETTER (OUTPATIENT)
Age: 78
End: 2023-12-10

## 2023-12-19 ENCOUNTER — OFFICE VISIT (OUTPATIENT)
Dept: PODIATRY | Facility: OTHER | Age: 78
End: 2023-12-19
Attending: FAMILY MEDICINE
Payer: COMMERCIAL

## 2023-12-19 VITALS
TEMPERATURE: 96.4 F | SYSTOLIC BLOOD PRESSURE: 176 MMHG | DIASTOLIC BLOOD PRESSURE: 82 MMHG | OXYGEN SATURATION: 99 % | HEART RATE: 69 BPM

## 2023-12-19 DIAGNOSIS — E11.9 DIABETES MELLITUS TYPE 2, NONINSULIN DEPENDENT (H): ICD-10-CM

## 2023-12-19 DIAGNOSIS — I89.0 LYMPHEDEMA: ICD-10-CM

## 2023-12-19 DIAGNOSIS — Z13.89 SCREENING FOR DIABETIC PERIPHERAL NEUROPATHY: ICD-10-CM

## 2023-12-19 DIAGNOSIS — L60.3 ONYCHODYSTROPHY: Primary | ICD-10-CM

## 2023-12-19 DIAGNOSIS — E11.42 DIABETIC POLYNEUROPATHY ASSOCIATED WITH TYPE 2 DIABETES MELLITUS (H): ICD-10-CM

## 2023-12-19 PROCEDURE — 11721 DEBRIDE NAIL 6 OR MORE: CPT | Performed by: PODIATRIST

## 2023-12-19 PROCEDURE — G0463 HOSPITAL OUTPT CLINIC VISIT: HCPCS

## 2023-12-19 PROCEDURE — 99207 PR FOOT EXAM NO CHARGE: CPT | Performed by: PODIATRIST

## 2023-12-19 ASSESSMENT — PAIN SCALES - GENERAL: PAINLEVEL: MILD PAIN (3)

## 2023-12-19 NOTE — PROGRESS NOTES
Chief complaint: Patient presents with:  Musculoskeletal Problem: 2nd left toe sore      History of Present Illness: This 78 year old NIDDM II male is seen at the request of No ref. provider found for evaluation and suggestions of management of thickening of the skin of the toes and an open wound on the LEFT third toe. His wife says he has a wound off and on in the toes. The patient has tried foot powder, but it gets too much of a paste when it mixes with wound drainage.    Patient is wheelchair bound and he has had diminished sensation tot he feet and legs for 18 years. The swelling in his legs has worsened for the past two  years. Over the past week, he has been elevating his feet more which is improving the swelling.    Patient also says he wears 30-40mmHg compression socks daily. He has difficulty finding shoes that fit his feet because of moderate edema in his bilateral foot.    He says he has some numbness to his feet.    No further pedal complaints today.     Last HbA1C was self-reported to be 7.6% in February, 2023.        BP (!) 176/82 (BP Location: Left arm, Patient Position: Sitting, Cuff Size: Adult Regular)   Pulse 69   Temp (!) 96.4  F (35.8  C) (Tympanic)   SpO2 99%     Patient Active Problem List   Diagnosis    Slurred speech    Prostate cancer (H)       Past Surgical History:   Procedure Laterality Date    COLONOSCOPY - HIM SCAN  04/19/2006    Colonoscopy at the [facility] 4/16/2006    EGD  04/20/2006    EXCISE LESION EAR EXTERNAL Left 06/23/2016    Procedure: EXCISE LESION EAR EXTERNAL;  Surgeon: Von Suresh DO;  Location: [facility]    FLEXIBLE SIGMOIDOSCOPY - HIM SCAN  03/24/2000    Flex Sigmoidoscopy - Hyperplastic polyp    SPINAL PUNCTURE,LUMBAR, DIAGNOSTIC  07/07/2005    diaphragm punctured       Current Outpatient Medications   Medication    amLODIPine (NORVASC) 10 MG tablet    aspirin (ASA) 81 MG EC tablet    atenolol (TENORMIN) 50 MG tablet    calcium carbonate 500 mg, elemental, (OSCAL  500) 1250 (500 Ca) MG TABS tablet    glyBURIDE (DIABETA /MICRONASE) 5 MG tablet    JANTOVEN ANTICOAGULANT 1 MG tablet    losartan (COZAAR) 100 MG tablet    METFORMIN HCL ER PO    omeprazole (PRILOSEC) 20 MG DR capsule    ONETOUCH ULTRA test strip    oxybutynin (DITROPAN-XL) 5 MG 24 hr tablet    simvastatin (ZOCOR) 10 MG tablet     No current facility-administered medications for this visit.          Allergies   Allergen Reactions    Seasonal Allergies      Not specified. (Found on problem list on inside front cover.)       Family History   Problem Relation Age of Onset    Hypertension Mother     Cerebrovascular Disease Father     Emphysema Father     Breast Cancer Sister     Fibromyalgia Sister     Cerebrovascular Disease Sister     Brain Cancer Sister     Coronary Artery Disease Sister     Prostate Cancer Brother     Lupus Brother     Coronary Artery Disease Son        Social History     Socioeconomic History    Marital status:      Spouse name: Myranda    Number of children: 3    Years of education: None    Highest education level: High school graduate   Occupational History    Occupation: Disabled:   Blaster     Employer: [employer]   Tobacco Use    Smoking status: Never    Smokeless tobacco: Never   Vaping Use    Vaping Use: Never used   Substance and Sexual Activity    Alcohol use: Not Currently     Comment: once in a while has a glass of wine with wife    Drug use: Never    Sexual activity: Not Currently   Social History Narrative     with three children, wife is caregive. 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    Hobbies: Use to carve wood, use to draw, paint, used to do rock work & carpentry, able to carve walking sticks at times, hard with limited right hand function.       ROS: 10 point ROS neg other than the symptoms noted above in the HPI.  EXAM  Constitutional: healthy, alert, and no distress    Psychiatric: mentation appears normal and affect  normal/bright    VASCULAR:  -Dorsalis pedis pulse +0/4 b/l secondary to edema  ---Biphasic on doppler bilaterally on 10/10/2023  -Posterior tibial pulse +2/4 b/l secondary to edema  ---Monophasic on doppler bilaterally on 10/10/2023  -Capillary refill time < 3 seconds to b/l hallux  -Hair growth absent to b/l anterior legs and ankles  -Varicosities and telangiectasias to foot, bilaterally   -Moderate 3+ pitting edema to foot and ankle, bilaterally   NEURO:  -Light touch sensation intact to b/l plantar forefoot  DERM:  -Skin temperature within normal limits to bilateral foot  -No maceration between the toes  -Toenails elongated, dystrophic and discolored x 10  -Significant edema with diffuse pitting of skin to bilateral foot (most prominent to the toes) and atrophic skin to the toes (most diffuse to the bilateral second toe)  Wound Location:  LEFT distal lateral second toe  12/19/2023: Healed    10/10/2023  Measurement:  0.3cm x 0.3cm x superficial through skin layer only  Drainage:  Mild serous  Odor:  None  Undermining:  None  Edges:  Intact  Base:  100% viable  Surrounding Skin: Intact  No severe erythema, no ascending erythema, no calor, no purulence, no malodor, no other signs of infection.     MSK:  -Moderate flexibility to all MTPJs of toes (toes are not rigidly contracted and stuck together)  -Muscle strength of ankles +5/5 for dorsiflexion, plantarflexion, ABDUction and ADDuction b/l    ============================================================    ASSESSMENT:  (L60.3) Onychodystrophy  (primary encounter diagnosis)    (I89.0) Lymphedema    (E11.9) Diabetes mellitus type 2, noninsulin dependent (H)    (E11.42) Diabetic polyneuropathy associated with type 2 diabetes mellitus (H)    (Z13.89) Screening for diabetic peripheral neuropathy        PLAN:  -Patient evaluated and examined. Treatment options discussed with no educational barriers noted.    -High risk toenail debridement x 10 toenails without  incident    -Diabetic Foot Education provided. This included checking the feet daily looking for new new blisters or wounds, wearing shoes at all times when walking including around the house, and avoiding lotion application between the toes. If there are any signs of infection, the patient should present to the ED as soon as possible. Infections of the foot can be life threatening or lead to amputations of the foot or leg.    Diabetes Mellitus: Patient's DM is managed by their PCP. The DM appears to be stable. Patient's last HbA1C was 7.6% in November, 2023, self-reported.    -Lymphedema is stable and managed by the patient. No open wounds although this places him at higher risk of open wounds.  -Patient in agreement with the above treatment plan and all of patient's questions were answered.      Return to clinic three months for a diabetic foot exam and high risk nail debridement         Maryam Chairez DPM

## 2024-04-18 ENCOUNTER — OFFICE VISIT (OUTPATIENT)
Dept: PODIATRY | Facility: OTHER | Age: 79
End: 2024-04-18
Attending: PODIATRIST
Payer: COMMERCIAL

## 2024-04-18 VITALS
HEIGHT: 71 IN | TEMPERATURE: 97.6 F | DIASTOLIC BLOOD PRESSURE: 84 MMHG | SYSTOLIC BLOOD PRESSURE: 184 MMHG | HEART RATE: 59 BPM | OXYGEN SATURATION: 98 % | BODY MASS INDEX: 25.8 KG/M2 | RESPIRATION RATE: 16 BRPM

## 2024-04-18 DIAGNOSIS — E11.9 DIABETES MELLITUS TYPE 2, NONINSULIN DEPENDENT (H): ICD-10-CM

## 2024-04-18 DIAGNOSIS — L60.3 ONYCHODYSTROPHY: Primary | ICD-10-CM

## 2024-04-18 DIAGNOSIS — E11.42 DIABETIC POLYNEUROPATHY ASSOCIATED WITH TYPE 2 DIABETES MELLITUS (H): ICD-10-CM

## 2024-04-18 DIAGNOSIS — Z13.89 SCREENING FOR DIABETIC PERIPHERAL NEUROPATHY: ICD-10-CM

## 2024-04-18 DIAGNOSIS — I89.0 LYMPHEDEMA: ICD-10-CM

## 2024-04-18 DIAGNOSIS — Z86.31 HEALED DIABETIC FOOT ULCER: ICD-10-CM

## 2024-04-18 PROCEDURE — G0463 HOSPITAL OUTPT CLINIC VISIT: HCPCS

## 2024-04-18 PROCEDURE — 11721 DEBRIDE NAIL 6 OR MORE: CPT | Performed by: PODIATRIST

## 2024-04-18 PROCEDURE — 99207 PR FOOT EXAM NO CHARGE: CPT | Performed by: PODIATRIST

## 2024-04-18 PROCEDURE — G0463 HOSPITAL OUTPT CLINIC VISIT: HCPCS | Mod: 25

## 2024-04-18 PROCEDURE — 99212 OFFICE O/P EST SF 10 MIN: CPT | Mod: 25 | Performed by: PODIATRIST

## 2024-04-18 ASSESSMENT — PAIN SCALES - GENERAL: PAINLEVEL: NO PAIN (0)

## 2024-04-18 NOTE — PROGRESS NOTES
"Chief complaint: Patient presents with:  Toenail: Nail trimming      History of Present Illness: This 78 year old NIDDM II male is seen for toenails debridement. He says he has had only two wounds since his last appointment. He monitors the feet. He developed a new wound on the LEFT dorsal second toe about six weeks ago and his wife says it has opened twice in the last six weeks. When it is open she applies an antibiotic ointment, gauze and tape. They would like the wound evaluated today.    Patient is wheelchair bound and he has had diminished sensation to the feet and legs for 18 years. The swelling in his legs has worsened for the past two  years. Over the past week, he has been elevating his feet more which is improving the swelling.    Patient also says he wears 30-40mmHg compression socks daily. He has difficulty finding shoes that fit his feet because of moderate edema in his bilateral foot.    He says he has some numbness to his feet.    No further pedal complaints today.     Last HbA1C was self-reported to be 6.6% in February, 2024 at [facility].        BP (!) 184/84 (BP Location: Left arm, Cuff Size: Adult Regular)   Pulse 59   Temp 97.6  F (36.4  C) (Tympanic)   Resp 16   Ht 1.803 m (5' 11\")   SpO2 98%   BMI 25.80 kg/m      Patient Active Problem List   Diagnosis    Slurred speech    Prostate cancer (H)    Diabetes mellitus, type 2 (H)    Esophageal reflux    Glucose tolerance test abnormal    Hereditary and idiopathic peripheral neuropathy    Hypertension       Past Surgical History:   Procedure Laterality Date    COLONOSCOPY - HIM SCAN  04/19/2006    Colonoscopy at the [facility] 4/16/2006    EGD  04/20/2006    EXCISE LESION EAR EXTERNAL Left 06/23/2016    Procedure: EXCISE LESION EAR EXTERNAL;  Surgeon: Von Suresh DO;  Location: [facility]    FLEXIBLE SIGMOIDOSCOPY - HIM SCAN  03/24/2000    Flex Sigmoidoscopy - Hyperplastic polyp    SPINAL PUNCTURE,LUMBAR, DIAGNOSTIC  07/07/2005    diaphragm " punctured       Current Outpatient Medications   Medication Sig Dispense Refill    amLODIPine (NORVASC) 10 MG tablet Take 1 tablet (10 mg) by mouth daily 30 tablet 0    aspirin (ASA) 81 MG EC tablet Take 1 tablet (81 mg) by mouth daily 30 tablet 0    atenolol (TENORMIN) 50 MG tablet Take 50 mg by mouth daily      calcium carbonate 500 mg, elemental, (OSCAL 500) 1250 (500 Ca) MG TABS tablet Take by mouth 2 times daily 250 mg BID      glyBURIDE (DIABETA /MICRONASE) 5 MG tablet Take 10 mg by mouth 2 times daily (with meals)       JANTOVEN ANTICOAGULANT 1 MG tablet 1mg 4 times per week, 1.5mg 3 times per week.  Every 6 week blood drawn.      losartan (COZAAR) 100 MG tablet Take 100 mg by mouth daily       METFORMIN HCL ER PO Take 1,000 mg by mouth 2 times daily      omeprazole (PRILOSEC) 20 MG DR capsule Take 40 mg by mouth every morning       ONETOUCH ULTRA test strip daily       oxybutynin (DITROPAN-XL) 5 MG 24 hr tablet Take 5 mg by mouth daily       simvastatin (ZOCOR) 10 MG tablet Take 1 tablet (10 mg) by mouth At Bedtime 30 tablet 0     No current facility-administered medications for this visit.          Allergies   Allergen Reactions    Seasonal Allergies      Not specified. (Found on problem list on inside front cover.)       Family History   Problem Relation Age of Onset    Hypertension Mother     Cerebrovascular Disease Father     Emphysema Father     Breast Cancer Sister     Fibromyalgia Sister     Cerebrovascular Disease Sister     Brain Cancer Sister     Coronary Artery Disease Sister     Prostate Cancer Brother     Lupus Brother     Coronary Artery Disease Son        Social History     Socioeconomic History    Marital status:      Spouse name: Myranda    Number of children: 3    Years of education: None    Highest education level: High school graduate   Occupational History    Occupation: Disabled:   Blaster     Employer: [employer]   Tobacco Use    Smoking status: Never    Smokeless  tobacco: Never   Vaping Use    Vaping Use: Never used   Substance and Sexual Activity    Alcohol use: Not Currently     Comment: once in a while has a glass of wine with wife    Drug use: Never    Sexual activity: Not Currently   Social History Narrative     with three children, wife is caregive. 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    Hobbies: Use to carve wood, use to draw, paint, used to do rock work & carpentry, able to carve walking sticks at times, hard with limited right hand function.       ROS: 10 point ROS neg other than the symptoms noted above in the HPI.  EXAM  Constitutional: healthy, alert, and no distress    Psychiatric: mentation appears normal and affect normal/bright    VASCULAR:  -Dorsalis pedis pulse +0/4 b/l secondary to edema  ---Biphasic on doppler bilaterally on 10/10/2023  -Posterior tibial pulse +2/4 b/l secondary to edema  ---Monophasic on doppler bilaterally on 10/10/2023  -Capillary refill time < 3 seconds to b/l hallux  -Hair growth absent to b/l anterior legs and ankles  -Varicosities and telangiectasias to foot, bilaterally   -Moderate 3+ pitting edema to foot and ankle, bilaterally   NEURO:  -Light touch sensation intact to b/l plantar forefoot  DERM:  -Skin temperature within normal limits to bilateral foot  -No maceration between the toes  -Toenails elongated, dystrophic and discolored x 10  -Significant edema with diffuse pitting of skin to bilateral foot (most prominent to the toes) and atrophic skin to the toes (most diffuse to the bilateral second toe)  Wound Location:  LEFT distal medial second toe    04/18/2024  Measurement:  0.3cm x 0.3cm x well-adhered scab  Drainage:  None  Odor:  None  Undermining:  None  Edges:  Intact  Base:  100% viable  Surrounding Skin: Intact  No severe erythema, no ascending erythema, no calor, no purulence, no malodor, no other signs of infection.     MSK:  -Moderate flexibility to all MTPJs of toes (toes are not rigidly  contracted and stuck together)  -Muscle strength of ankles +5/5 for dorsiflexion, plantarflexion, ABDUction and ADDuction b/l    ============================================================    ASSESSMENT:  (L60.3) Onychodystrophy  (primary encounter diagnosis)    (Z86.31) Healed diabetic foot ulcer    (I89.0) Lymphedema    (E11.9) Diabetes mellitus type 2, noninsulin dependent (H)    (E11.42) Diabetic polyneuropathy associated with type 2 diabetes mellitus (H)    (Z13.89) Screening for diabetic peripheral neuropathy        PLAN:  -Patient evaluated and examined. Treatment options discussed with no educational barriers noted.    -High risk toenail debridement x 10 toenails without incident    -Diabetic Foot Education provided. This included checking the feet daily looking for new new blisters or wounds, wearing shoes at all times when walking including around the house, and avoiding lotion application between the toes. If there are any signs of infection, the patient should present to the ED as soon as possible. Infections of the foot can be life threatening or lead to amputations of the foot or leg.    Diabetes Mellitus: Patient's DM is managed by their PCP. The DM appears to be stable. Patient's last HbA1C was 7.6% in November, 2023, self-reported.    -Lymphedema is stable and managed by the patient. No open wounds although this places him at higher risk of open wounds.    ------------------------------------------    LEFT second toe scab and recently healed diabetic foot ulcer: The wound has healed. There is a small scab remaining without drainage. Applied Xeroform, gauze and tape. Patient to refrain from picking the scab. The Xeroform will prevent the scab from coming off on gauze or in his socks. Apply daily until scab is healed.  ---No severe erythema, no ascending erythema, no calor, no purulence, no malodor, no other signs of infection.     -Patient in agreement with the above treatment plan and all of  patient's questions were answered.      Return to clinic three months for a diabetic foot exam and high risk nail debridement         Maryam Chairez DPM

## 2024-04-25 ENCOUNTER — HOSPITAL ENCOUNTER (EMERGENCY)
Facility: HOSPITAL | Age: 79
Discharge: HOME | End: 2024-04-25
Attending: NURSE PRACTITIONER | Admitting: NURSE PRACTITIONER
Payer: COMMERCIAL

## 2024-04-25 VITALS
DIASTOLIC BLOOD PRESSURE: 83 MMHG | TEMPERATURE: 97 F | RESPIRATION RATE: 18 BRPM | SYSTOLIC BLOOD PRESSURE: 172 MMHG | OXYGEN SATURATION: 97 % | HEART RATE: 73 BPM

## 2024-04-25 DIAGNOSIS — S91.109A AVULSION OF SKIN OF TOE, INITIAL ENCOUNTER: Primary | ICD-10-CM

## 2024-04-25 PROCEDURE — G0463 HOSPITAL OUTPT CLINIC VISIT: HCPCS

## 2024-04-25 PROCEDURE — 99213 OFFICE O/P EST LOW 20 MIN: CPT | Performed by: NURSE PRACTITIONER

## 2024-04-25 RX ORDER — LIDOCAINE HYDROCHLORIDE 20 MG/ML
45 INJECTION, SOLUTION INFILTRATION; PERINEURAL ONCE
Status: DISCONTINUED | OUTPATIENT
Start: 2024-04-25 | End: 2024-04-25

## 2024-04-25 ASSESSMENT — COLUMBIA-SUICIDE SEVERITY RATING SCALE - C-SSRS
6. HAVE YOU EVER DONE ANYTHING, STARTED TO DO ANYTHING, OR PREPARED TO DO ANYTHING TO END YOUR LIFE?: NO
1. IN THE PAST MONTH, HAVE YOU WISHED YOU WERE DEAD OR WISHED YOU COULD GO TO SLEEP AND NOT WAKE UP?: NO
2. HAVE YOU ACTUALLY HAD ANY THOUGHTS OF KILLING YOURSELF IN THE PAST MONTH?: NO

## 2024-04-25 ASSESSMENT — ENCOUNTER SYMPTOMS
FEVER: 0
VOMITING: 0
PSYCHIATRIC NEGATIVE: 1
NAUSEA: 0
SHORTNESS OF BREATH: 0
WOUND: 1
DIARRHEA: 0
CHILLS: 0

## 2024-04-25 ASSESSMENT — ACTIVITIES OF DAILY LIVING (ADL)
ADLS_ACUITY_SCORE: 36
ADLS_ACUITY_SCORE: 36

## 2024-04-25 NOTE — ED NOTES
SINDY Boo CNP assessed patient in triage and determined patient Urgent Care appropriate. Will be seen in Urgent Care.

## 2024-04-25 NOTE — ED PROVIDER NOTES
History     Chief Complaint   Patient presents with    Laceration     HPI  Clem Bishop is a 78 year old male who presents urgent care today via wheelchair with complaints of skin avulsion to right great toe.  Patient was using his electric wheelchair when he scraped the side of a door frame.  Wound is actively bleeding, currently on jantoven.  Follows up with podiatry as needed at [facility].  Denies any fever, chills, nausea, vomiting, diarrhea, shortness of breath or chest pain.  Patient declines any imaging of great toe today, states he did not hit that hard.  Last Tdap was completed on 5/30/2022, currently up-to-date.  No other concerns.    Allergies:  Allergies   Allergen Reactions    Seasonal Allergies      Not specified. (Found on problem list on inside front cover.)       Problem List:    Patient Active Problem List    Diagnosis Date Noted    Prostate cancer (H) 01/25/2022     Priority: Medium    Slurred speech 11/23/2019     Priority: Medium    Diabetes mellitus, type 2 (H) 07/30/2013     Priority: Medium     A1C 9.4      Esophageal reflux 03/08/2011     Priority: Medium    Hypertension 03/08/2011     Priority: Medium    Glucose tolerance test abnormal 06/26/2008     Priority: Medium     Due to steroid therapy.      Hereditary and idiopathic peripheral neuropathy 07/07/2005     Priority: Medium     IMO Update          Past Medical History:    Past Medical History:   Diagnosis Date    Actinic keratosis     Broken arm     Cerebrovascular accident (H) 11/23/2019    Chronic deep vein thrombosis (DVT) of left lower extremity, unspecified vein (H)     Diabetes mellitus without complication (H)     Elevated prostate specific antigen (PSA)     History of basal cell carcinoma     Hypertension     Hypertension, essential     Long term current use of anticoagulant therapy     Mixed hyperlipidemia     Neurosarcoidosis 2005    Paraplegia (H) 2005    Personal history of venous thrombosis and embolism     Prostate  cancer (H)        Past Surgical History:    Past Surgical History:   Procedure Laterality Date    COLONOSCOPY - HIM SCAN  04/19/2006    Colonoscopy at the [facility] 4/16/2006    EGD  04/20/2006    EXCISE LESION EAR EXTERNAL Left 06/23/2016    Procedure: EXCISE LESION EAR EXTERNAL;  Surgeon: Von Suresh DO;  Location: [facility]    FLEXIBLE SIGMOIDOSCOPY - HIM SCAN  03/24/2000    Flex Sigmoidoscopy - Hyperplastic polyp    SPINAL PUNCTURE,LUMBAR, DIAGNOSTIC  07/07/2005    diaphragm punctured       Family History:    Family History   Problem Relation Age of Onset    Hypertension Mother     Cerebrovascular Disease Father     Emphysema Father     Breast Cancer Sister     Fibromyalgia Sister     Cerebrovascular Disease Sister     Brain Cancer Sister     Coronary Artery Disease Sister     Prostate Cancer Brother     Lupus Brother     Coronary Artery Disease Son        Social History:  Marital Status:   [2]  Social History     Tobacco Use    Smoking status: Never    Smokeless tobacco: Never   Vaping Use    Vaping status: Never Used   Substance Use Topics    Alcohol use: Not Currently     Comment: once in a while has a glass of wine with wife    Drug use: Never        Medications:    JANTOVEN ANTICOAGULANT 1 MG tablet  amLODIPine (NORVASC) 10 MG tablet  aspirin (ASA) 81 MG EC tablet  atenolol (TENORMIN) 50 MG tablet  calcium carbonate 500 mg, elemental, (OSCAL 500) 1250 (500 Ca) MG TABS tablet  glyBURIDE (DIABETA /MICRONASE) 5 MG tablet  losartan (COZAAR) 100 MG tablet  METFORMIN HCL ER PO  omeprazole (PRILOSEC) 20 MG DR capsule  ONETOUCH ULTRA test strip  oxybutynin (DITROPAN-XL) 5 MG 24 hr tablet  simvastatin (ZOCOR) 10 MG tablet      Review of Systems   Constitutional:  Negative for chills and fever.   Respiratory:  Negative for shortness of breath.    Cardiovascular:  Negative for chest pain.   Gastrointestinal:  Negative for diarrhea, nausea and vomiting.   Musculoskeletal:  Negative for gait problem.   Skin:   "Positive for wound (Avulsion of skin to right great toe, actively bleeding).   Psychiatric/Behavioral: Negative.       Physical Exam   BP: 172/83  Pulse: 73  Temp: 97  F (36.1  C)  Resp: 18  SpO2: 97 %    Physical Exam  Vitals and nursing note reviewed.   Constitutional:       General: He is not in acute distress.     Appearance: Normal appearance. He is not ill-appearing or toxic-appearing.   Cardiovascular:      Rate and Rhythm: Normal rate and regular rhythm.      Pulses: Normal pulses.      Heart sounds: Normal heart sounds.   Pulmonary:      Effort: Pulmonary effort is normal.      Breath sounds: Normal breath sounds.   Skin:     Findings: Wound (2 cm avulsion of skin to right great toe, actively bleeding.  Wound soaked in warm soapy water, flushed with normal saline, Surgifoam placed followed by dressing.) present.   Neurological:      Mental Status: He is alert.   Psychiatric:         Mood and Affect: Mood normal.       ED Course     Procedures    No results found for this or any previous visit (from the past 24 hour(s)).    Medications - No data to display      Assessments & Plan (with Medical Decision Making)     I have reviewed the nursing notes.    I have reviewed the findings, diagnosis, plan and need for follow up with the patient.  (S91.109A) Avulsion of skin of toe, initial encounter  (primary encounter diagnosis)  Plan:   Patient arrived via wheelchair with a nontoxic appearance.  2 cm skin avulsion to right great toe, actively bleeding, currently on jantoven.  Incident occurred this morning.  Patient declines x-ray of right foot stating \"I did not hit that hard. \"Foot soaked in warm soapy water, flushed with normal saline, Surgifoam placed followed by dressing.  Did not place pressure dressing.  Patient to rest and elevate right foot.  Monitor for bleeding and infection.  Change dressing in 24 hours.  Follow-up with primary care provider or return to urgent care/ED with any worsening in condition or " additional concerns.  Patient in agreement treatment plan.    New Prescriptions    No medications on file     Final diagnoses:   Avulsion of skin of toe, initial encounter     4/25/2024   [facility]       Soledad Mccallum, NP  04/25/24 [id number]

## 2024-04-25 NOTE — ED TRIAGE NOTES
Pt presents with c/o laceration    Great toe on right foot.   Electric wheel chair use at home, caught a corner of a door way and caught his big toe.  Bandage changed 3x    Blood thinner use

## 2024-04-25 NOTE — DISCHARGE INSTRUCTIONS
Keep dressing in place for 24 hours    Elevate    Monitor for infection    Follow-up with primary care provider or return to urgent care/ED with any worsening in condition or additional concerns.

## 2024-04-28 ENCOUNTER — HEALTH MAINTENANCE LETTER (OUTPATIENT)
Age: 79
End: 2024-04-28

## 2024-07-06 ENCOUNTER — HEALTH MAINTENANCE LETTER (OUTPATIENT)
Age: 79
End: 2024-07-06

## 2024-08-20 ENCOUNTER — OFFICE VISIT (OUTPATIENT)
Dept: PODIATRY | Facility: OTHER | Age: 79
End: 2024-08-20
Attending: PODIATRIST
Payer: COMMERCIAL

## 2024-08-20 VITALS
SYSTOLIC BLOOD PRESSURE: 162 MMHG | HEART RATE: 58 BPM | DIASTOLIC BLOOD PRESSURE: 76 MMHG | TEMPERATURE: 96.6 F | OXYGEN SATURATION: 98 %

## 2024-08-20 DIAGNOSIS — Z13.89 SCREENING FOR DIABETIC PERIPHERAL NEUROPATHY: ICD-10-CM

## 2024-08-20 DIAGNOSIS — Z86.31 PERSONAL HISTORY OF DIABETIC FOOT ULCER: ICD-10-CM

## 2024-08-20 DIAGNOSIS — E11.9 DIABETES MELLITUS TYPE 2, NONINSULIN DEPENDENT (H): ICD-10-CM

## 2024-08-20 DIAGNOSIS — I89.0 LYMPHEDEMA: ICD-10-CM

## 2024-08-20 DIAGNOSIS — L60.3 ONYCHODYSTROPHY: Primary | ICD-10-CM

## 2024-08-20 DIAGNOSIS — E11.42 DIABETIC POLYNEUROPATHY ASSOCIATED WITH TYPE 2 DIABETES MELLITUS (H): ICD-10-CM

## 2024-08-20 PROCEDURE — G0463 HOSPITAL OUTPT CLINIC VISIT: HCPCS

## 2024-08-20 PROCEDURE — 11721 DEBRIDE NAIL 6 OR MORE: CPT | Performed by: PODIATRIST

## 2024-08-20 PROCEDURE — 99207 PR FOOT EXAM NO CHARGE: CPT | Performed by: PODIATRIST

## 2024-08-20 ASSESSMENT — PAIN SCALES - GENERAL: PAINLEVEL: NO PAIN (0)

## 2024-08-20 NOTE — PROGRESS NOTES
Chief complaint: Patient presents with:  Wound Check      History of Present Illness: This 79 year old NIDDM II male is seen for toenails debridement. His wife checks his feet and dries between his toes daily. He has not recently had open wounds on his toes.    Patient also says he wears 30-40mmHg compression socks daily. He has difficulty finding shoes that fit his feet because of moderate edema in his bilateral foot.    He says he has some numbness to his feet.    No further pedal complaints today.     Last HbA1C was self-reported to be 7.4% on 08/06/2024.      BP (!) 162/76 (BP Location: Left arm, Patient Position: Sitting, Cuff Size: Adult Regular)   Pulse 58   Temp (!) 96.6  F (35.9  C) (Tympanic)   SpO2 98%     Patient Active Problem List   Diagnosis    Slurred speech    Prostate cancer (H)    Diabetes mellitus, type 2 (H)    Esophageal reflux    Glucose tolerance test abnormal    Hereditary and idiopathic peripheral neuropathy    Hypertension       Past Surgical History:   Procedure Laterality Date    COLONOSCOPY - HIM SCAN  04/19/2006    Colonoscopy at the [facility] 4/16/2006    EGD  04/20/2006    EXCISE LESION EAR EXTERNAL Left 06/23/2016    Procedure: EXCISE LESION EAR EXTERNAL;  Surgeon: Von Suresh DO;  Location: [facility]    FLEXIBLE SIGMOIDOSCOPY - HIM SCAN  03/24/2000    Flex Sigmoidoscopy - Hyperplastic polyp    SPINAL PUNCTURE,LUMBAR, DIAGNOSTIC  07/07/2005    diaphragm punctured       Current Outpatient Medications   Medication Sig Dispense Refill    amLODIPine (NORVASC) 10 MG tablet Take 1 tablet (10 mg) by mouth daily 30 tablet 0    aspirin (ASA) 81 MG EC tablet Take 1 tablet (81 mg) by mouth daily 30 tablet 0    atenolol (TENORMIN) 50 MG tablet Take 50 mg by mouth daily      calcium carbonate 500 mg, elemental, (OSCAL 500) 1250 (500 Ca) MG TABS tablet Take by mouth 2 times daily 250 mg BID      glyBURIDE (DIABETA /MICRONASE) 5 MG tablet Take 10 mg by mouth 2 times daily (with meals)        ALEXX ANTICOAGULANT 1 MG tablet 1mg 4 times per week, 1.5mg 3 times per week.  Every 6 week blood drawn.      losartan (COZAAR) 100 MG tablet Take 100 mg by mouth daily       METFORMIN HCL ER PO Take 1,000 mg by mouth 2 times daily      omeprazole (PRILOSEC) 20 MG DR capsule Take 40 mg by mouth every morning       ONETOUCH ULTRA test strip daily       oxybutynin (DITROPAN-XL) 5 MG 24 hr tablet Take 5 mg by mouth daily       simvastatin (ZOCOR) 10 MG tablet Take 1 tablet (10 mg) by mouth At Bedtime 30 tablet 0     No current facility-administered medications for this visit.          Allergies   Allergen Reactions    Seasonal Allergies      Not specified. (Found on problem list on inside front cover.)       Family History   Problem Relation Age of Onset    Hypertension Mother     Cerebrovascular Disease Father     Emphysema Father     Breast Cancer Sister     Fibromyalgia Sister     Cerebrovascular Disease Sister     Brain Cancer Sister     Coronary Artery Disease Sister     Prostate Cancer Brother     Lupus Brother     Coronary Artery Disease Son        Social History     Socioeconomic History    Marital status:      Spouse name: Myranda    Number of children: 3    Years of education: None    Highest education level: High school graduate   Occupational History    Occupation: Disabled:   Blaster     Employer: [employer]   Tobacco Use    Smoking status: Never    Smokeless tobacco: Never   Vaping Use    Vaping Use: Never used   Substance and Sexual Activity    Alcohol use: Not Currently     Comment: once in a while has a glass of wine with wife    Drug use: Never    Sexual activity: Not Currently   Social History Narrative     with three children, wife is caregive. 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    Hobbies: Use to carve wood, use to draw, paint, used to do rock work & carpentry, able to carve walking sticks at times, hard with limited right hand function.       ROS: 10 point ROS  neg other than the symptoms noted above in the HPI.  EXAM  Constitutional: healthy, alert, and no distress    Psychiatric: mentation appears normal and affect normal/bright    VASCULAR:  -Dorsalis pedis pulse +0/4 b/l secondary to edema  ---Biphasic on doppler bilaterally on 10/10/2023  -Posterior tibial pulse +2/4 b/l secondary to edema  ---Monophasic on doppler bilaterally on 10/10/2023  -Capillary refill time < 3 seconds to b/l hallux  -Hair growth absent to b/l anterior legs and ankles  -Varicosities and telangiectasias to foot, bilaterally   -Moderate 3+ pitting edema to foot and ankle, bilaterally   NEURO:  -Light touch sensation intact to b/l plantar forefoot  DERM:  -Skin temperature within normal limits to bilateral foot  -No maceration between the toes  -Toenails elongated, dystrophic and discolored x 10  -Significant edema with diffuse pitting of skin to bilateral foot (most prominent to the toes) and atrophic skin to the toes (most diffuse to the bilateral second toe)  Wound Location:  LEFT distal medial second toe    04/18/2024  Measurement:  0.3cm x 0.3cm x well-adhered scab  Drainage:  None  Odor:  None  Undermining:  None  Edges:  Intact  Base:  100% viable  Surrounding Skin: Intact  No severe erythema, no ascending erythema, no calor, no purulence, no malodor, no other signs of infection.     MSK:  -Moderate flexibility to all MTPJs of toes (toes are not rigidly contracted and stuck together)  -Muscle strength of ankles +5/5 for dorsiflexion, plantarflexion, ABDUction and ADDuction b/l    ============================================================    ASSESSMENT:  (L60.3) Onychodystrophy  (primary encounter diagnosis)    (I89.0) Lymphedema    (Z86.31) Personal history of diabetic foot ulcer    (E11.9) Diabetes mellitus type 2, noninsulin dependent (H)    (E11.42) Diabetic polyneuropathy associated with type 2 diabetes mellitus (H)    (Z13.89) Screening for diabetic peripheral  neuropathy        PLAN:  -Patient evaluated and examined. Treatment options discussed with no educational barriers noted.    -High risk toenail debridement x 10 toenails without incident    -Diabetic Foot Education provided. This included checking the feet daily looking for new new blisters or wounds, wearing shoes at all times when walking including around the house, and avoiding lotion application between the toes. If there are any signs of infection, the patient should present to the ED as soon as possible. Infections of the foot can be life threatening or lead to amputations of the foot or leg.    Diabetes Mellitus: Patient's DM is managed by their PCP. The DM appears to be stable. Patient's last HbA1C was 7.4% on 08/06/2024.    -Lymphedema is stable and managed by the patient. No open wounds although this places him at higher risk of open wounds.    -Patient in agreement with the above treatment plan and all of patient's questions were answered.      Return to clinic four months for a diabetic foot exam and high risk nail debridement per patient request        Maryam Chairez DPM

## 2024-09-14 ENCOUNTER — HEALTH MAINTENANCE LETTER (OUTPATIENT)
Age: 79
End: 2024-09-14

## 2024-12-17 ENCOUNTER — OFFICE VISIT (OUTPATIENT)
Dept: PODIATRY | Facility: OTHER | Age: 79
End: 2024-12-17
Attending: PODIATRIST
Payer: COMMERCIAL

## 2024-12-17 VITALS
TEMPERATURE: 96.7 F | HEART RATE: 65 BPM | SYSTOLIC BLOOD PRESSURE: 149 MMHG | DIASTOLIC BLOOD PRESSURE: 78 MMHG | OXYGEN SATURATION: 98 %

## 2024-12-17 DIAGNOSIS — L60.3 ONYCHODYSTROPHY: Primary | ICD-10-CM

## 2024-12-17 DIAGNOSIS — I89.0 LYMPHEDEMA: ICD-10-CM

## 2024-12-17 DIAGNOSIS — E11.42 DIABETIC POLYNEUROPATHY ASSOCIATED WITH TYPE 2 DIABETES MELLITUS (H): ICD-10-CM

## 2024-12-17 DIAGNOSIS — Z13.89 SCREENING FOR DIABETIC PERIPHERAL NEUROPATHY: ICD-10-CM

## 2024-12-17 DIAGNOSIS — E11.9 DIABETES MELLITUS TYPE 2, NONINSULIN DEPENDENT (H): ICD-10-CM

## 2024-12-17 DIAGNOSIS — Z86.31 PERSONAL HISTORY OF DIABETIC FOOT ULCER: ICD-10-CM

## 2024-12-17 PROCEDURE — G0463 HOSPITAL OUTPT CLINIC VISIT: HCPCS

## 2024-12-17 PROCEDURE — 11721 DEBRIDE NAIL 6 OR MORE: CPT | Performed by: PODIATRIST

## 2024-12-17 ASSESSMENT — PAIN SCALES - GENERAL: PAINLEVEL_OUTOF10: NO PAIN (0)

## 2024-12-17 NOTE — PROGRESS NOTES
Chief complaint: Patient presents with:  Toenail: trimming      History of Present Illness: This 79 year old NIDDM II male is seen for toenails debridement. His wife checks his feet and dries between his toes daily. He has not recently had open wounds on his toes.    Patient also says he wears 30-40mmHg compression socks daily. He has difficulty finding shoes that fit his feet because of moderate edema in his bilateral foot.    He has not had any recent wounds on his feet.    He says he has some numbness to his feet.    No further pedal complaints today.       Last HbA1C was self-reported to be 7.4% on 08/06/2024.      BP (!) 149/78 (BP Location: Left arm, Patient Position: Sitting, Cuff Size: Adult Regular)   Pulse 65   Temp (!) 96.7  F (35.9  C) (Tympanic)   SpO2 98%     Patient Active Problem List   Diagnosis    Slurred speech    Prostate cancer (H)    Diabetes mellitus, type 2 (H)    Esophageal reflux    Glucose tolerance test abnormal    Hereditary and idiopathic peripheral neuropathy    Hypertension       Past Surgical History:   Procedure Laterality Date    COLONOSCOPY - HIM SCAN  04/19/2006    Colonoscopy at the [facility] 4/16/2006    EGD  04/20/2006    EXCISE LESION EAR EXTERNAL Left 06/23/2016    Procedure: EXCISE LESION EAR EXTERNAL;  Surgeon: Von Suresh DO;  Location: [facility]    FLEXIBLE SIGMOIDOSCOPY - HIM SCAN  03/24/2000    Flex Sigmoidoscopy - Hyperplastic polyp    SPINAL PUNCTURE,LUMBAR, DIAGNOSTIC  07/07/2005    diaphragm punctured       Current Outpatient Medications   Medication Sig Dispense Refill    amLODIPine (NORVASC) 10 MG tablet Take 1 tablet (10 mg) by mouth daily 30 tablet 0    aspirin (ASA) 81 MG EC tablet Take 1 tablet (81 mg) by mouth daily 30 tablet 0    atenolol (TENORMIN) 50 MG tablet Take 50 mg by mouth daily      calcium carbonate 500 mg, elemental, (OSCAL 500) 1250 (500 Ca) MG TABS tablet Take by mouth 2 times daily 250 mg BID      glyBURIDE (DIABETA /MICRONASE) 5 MG  tablet Take 10 mg by mouth 2 times daily (with meals)       JANTOVEN ANTICOAGULANT 1 MG tablet 1mg 4 times per week, 1.5mg 3 times per week.  Every 6 week blood drawn.      losartan (COZAAR) 100 MG tablet Take 100 mg by mouth daily       METFORMIN HCL ER PO Take 1,000 mg by mouth 2 times daily      omeprazole (PRILOSEC) 20 MG DR capsule Take 40 mg by mouth every morning       ONETOUCH ULTRA test strip daily       oxybutynin (DITROPAN-XL) 5 MG 24 hr tablet Take 5 mg by mouth daily       simvastatin (ZOCOR) 10 MG tablet Take 1 tablet (10 mg) by mouth At Bedtime 30 tablet 0     No current facility-administered medications for this visit.          Allergies   Allergen Reactions    Seasonal Allergies      Not specified. (Found on problem list on inside front cover.)       Family History   Problem Relation Age of Onset    Hypertension Mother     Cerebrovascular Disease Father     Emphysema Father     Breast Cancer Sister     Fibromyalgia Sister     Cerebrovascular Disease Sister     Brain Cancer Sister     Coronary Artery Disease Sister     Prostate Cancer Brother     Lupus Brother     Coronary Artery Disease Son        Social History     Socioeconomic History    Marital status:      Spouse name: Myranda    Number of children: 3    Years of education: None    Highest education level: High school graduate   Occupational History    Occupation: Disabled:   Blaster     Employer: [employer]   Tobacco Use    Smoking status: Never    Smokeless tobacco: Never   Vaping Use    Vaping Use: Never used   Substance and Sexual Activity    Alcohol use: Not Currently     Comment: once in a while has a glass of wine with wife    Drug use: Never    Sexual activity: Not Currently   Social History Narrative     with three children, wife is caregive. 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    Hobbies: Use to carve wood, use to draw, paint, used to do rock work & carpentry, able to carve walking sticks at times,  hard with limited right hand function.       ROS: 10 point ROS neg other than the symptoms noted above in the HPI.  EXAM  Constitutional: healthy, alert, and no distress    Psychiatric: mentation appears normal and affect normal/bright    VASCULAR:  -Dorsalis pedis pulse +0/4 b/l secondary to edema  ---Biphasic on doppler bilaterally on 10/10/2023  -Posterior tibial pulse +2/4 b/l secondary to edema  ---Monophasic on doppler bilaterally on 10/10/2023  -Capillary refill time < 3 seconds to b/l hallux  -Hair growth absent to b/l anterior legs and ankles  -Varicosities and telangiectasias to foot, bilaterally   -Moderate 3+ pitting edema to foot and ankle, bilaterally   NEURO:  -Light touch sensation intact to b/l plantar forefoot  DERM:  -Skin temperature within normal limits to bilateral foot  -No maceration between the toes  -Toenails elongated, dystrophic and discolored x 10  -Significant edema with diffuse pitting of skin to bilateral foot (most prominent to the toes) and atrophic skin to the toes (most diffuse to the bilateral second toe)  MSK:  -Moderate flexibility to all MTPJs of toes (toes are not rigidly contracted and stuck together)  -Muscle strength of ankles +5/5 for dorsiflexion, plantarflexion, ABDUction and ADDuction b/l    ============================================================    ASSESSMENT:  (L60.3) Onychodystrophy  (primary encounter diagnosis)    (I89.0) Lymphedema    (Z86.31) Personal history of diabetic foot ulcer    (E11.9) Diabetes mellitus type 2, noninsulin dependent (H)    (E11.42) Diabetic polyneuropathy associated with type 2 diabetes mellitus (H)    (Z13.89) Screening for diabetic peripheral neuropathy      PLAN:  -Patient evaluated and examined. Treatment options discussed with no educational barriers noted.    -High risk toenail debridement x 10 toenails without incident    -Diabetic Foot Education provided. This included checking the feet daily looking for new new blisters or  wounds, wearing shoes at all times when walking including around the house, and avoiding lotion application between the toes. If there are any signs of infection, the patient should present to the ED as soon as possible. Infections of the foot can be life threatening or lead to amputations of the foot or leg.    Diabetes Mellitus: Patient's DM is managed by their PCP. The DM appears to be stable. Patient's last HbA1C was 7.4% on 08/06/2024.    -Lymphedema is stable and managed by the patient. No open wounds although this places him at higher risk of open wounds.    -Patient in agreement with the above treatment plan and all of patient's questions were answered.      Return to clinic four months for a diabetic foot exam and high risk nail debridement per patient request        Maryam Chairez DPM

## 2025-01-11 ENCOUNTER — HEALTH MAINTENANCE LETTER (OUTPATIENT)
Age: 80
End: 2025-01-11

## 2025-03-09 ENCOUNTER — HEALTH MAINTENANCE LETTER (OUTPATIENT)
Age: 80
End: 2025-03-09

## 2025-04-15 ENCOUNTER — OFFICE VISIT (OUTPATIENT)
Dept: PODIATRY | Facility: OTHER | Age: 80
End: 2025-04-15
Attending: PODIATRIST
Payer: COMMERCIAL

## 2025-04-15 VITALS
TEMPERATURE: 97 F | RESPIRATION RATE: 18 BRPM | OXYGEN SATURATION: 96 % | SYSTOLIC BLOOD PRESSURE: 168 MMHG | HEART RATE: 69 BPM | DIASTOLIC BLOOD PRESSURE: 78 MMHG

## 2025-04-15 DIAGNOSIS — Z13.89 SCREENING FOR DIABETIC PERIPHERAL NEUROPATHY: ICD-10-CM

## 2025-04-15 DIAGNOSIS — L60.3 ONYCHODYSTROPHY: ICD-10-CM

## 2025-04-15 DIAGNOSIS — E11.9 DIABETES MELLITUS TYPE 2, NONINSULIN DEPENDENT (H): ICD-10-CM

## 2025-04-15 DIAGNOSIS — Z86.31 PERSONAL HISTORY OF DIABETIC FOOT ULCER: ICD-10-CM

## 2025-04-15 DIAGNOSIS — E11.42 DIABETIC POLYNEUROPATHY ASSOCIATED WITH TYPE 2 DIABETES MELLITUS (H): Primary | ICD-10-CM

## 2025-04-15 PROCEDURE — G0463 HOSPITAL OUTPT CLINIC VISIT: HCPCS

## 2025-04-15 RX ORDER — SITAGLIPTIN 100 MG/1
1 TABLET, FILM COATED ORAL DAILY
COMMUNITY
Start: 2025-03-25

## 2025-04-15 RX ORDER — PIOGLITAZONE 15 MG/1
15 TABLET ORAL DAILY
COMMUNITY
Start: 2024-09-24

## 2025-04-15 ASSESSMENT — PAIN SCALES - GENERAL: PAINLEVEL_OUTOF10: MODERATE PAIN (6)

## 2025-04-15 NOTE — PROGRESS NOTES
Chief complaint: Patient presents with:  Toenail: trim      History of Present Illness: This 79 year old NIDDM II male is seen for toenails debridement. His wife checks his feet and dries between his toes daily. He has not recently had open wounds on his toes.    Patient also says he wears 30-40mmHg compression socks daily. He has difficulty finding shoes that fit his feet because of moderate edema in his bilateral foot.    He has not had any recent wounds on his feet.    He says he has some numbness to his feet.    No further pedal complaints today.       Last HbA1C was 6.7% on 02/07/2025.      BP (!) 168/78   Pulse 69   Temp 97  F (36.1  C) (Tympanic)   Resp 18   SpO2 96%     Patient Active Problem List   Diagnosis    Slurred speech    Prostate cancer (H)    Diabetes mellitus, type 2 (H)    Esophageal reflux    Glucose tolerance test abnormal    Hereditary and idiopathic peripheral neuropathy    Hypertension       Past Surgical History:   Procedure Laterality Date    COLONOSCOPY - HIM SCAN  04/19/2006    Colonoscopy at the [facility] 4/16/2006    EGD  04/20/2006    EXCISE LESION EAR EXTERNAL Left 06/23/2016    Procedure: EXCISE LESION EAR EXTERNAL;  Surgeon: Von Suresh DO;  Location: [facility]    FLEXIBLE SIGMOIDOSCOPY - HIM SCAN  03/24/2000    Flex Sigmoidoscopy - Hyperplastic polyp    SPINAL PUNCTURE,LUMBAR, DIAGNOSTIC  07/07/2005    diaphragm punctured       Current Outpatient Medications   Medication Sig Dispense Refill    amLODIPine (NORVASC) 10 MG tablet Take 1 tablet (10 mg) by mouth daily 30 tablet 0    aspirin (ASA) 81 MG EC tablet Take 1 tablet (81 mg) by mouth daily 30 tablet 0    atenolol (TENORMIN) 50 MG tablet Take 50 mg by mouth daily      calcium carbonate 500 mg, elemental, (OSCAL 500) 1250 (500 Ca) MG TABS tablet Take by mouth 2 times daily 250 mg BID      glyBURIDE (DIABETA /MICRONASE) 5 MG tablet Take 10 mg by mouth 2 times daily (with meals)       JANTOVEN ANTICOAGULANT 1 MG tablet 1mg  4 times per week, 1.5mg 3 times per week.  Every 6 week blood drawn.      JANUVIA 100 MG tablet Take 1 tablet by mouth daily.      losartan (COZAAR) 100 MG tablet Take 100 mg by mouth daily       METFORMIN HCL ER PO Take 1,000 mg by mouth 2 times daily      omeprazole (PRILOSEC) 20 MG DR capsule Take 40 mg by mouth every morning       ONETOUCH ULTRA test strip daily       oxybutynin (DITROPAN-XL) 5 MG 24 hr tablet Take 5 mg by mouth daily       pioglitazone (ACTOS) 15 MG tablet Take 15 mg by mouth daily.      simvastatin (ZOCOR) 10 MG tablet Take 1 tablet (10 mg) by mouth At Bedtime 30 tablet 0     No current facility-administered medications for this visit.          Allergies   Allergen Reactions    Seasonal Allergies      Not specified. (Found on problem list on inside front cover.)       Family History   Problem Relation Age of Onset    Hypertension Mother     Cerebrovascular Disease Father     Emphysema Father     Breast Cancer Sister     Fibromyalgia Sister     Cerebrovascular Disease Sister     Brain Cancer Sister     Coronary Artery Disease Sister     Prostate Cancer Brother     Lupus Brother     Coronary Artery Disease Son        Social History     Socioeconomic History    Marital status:      Spouse name: Myranda    Number of children: 3    Years of education: None    Highest education level: High school graduate   Occupational History    Occupation: Disabled:   Blaster     Employer: [employer]   Tobacco Use    Smoking status: Never    Smokeless tobacco: Never   Vaping Use    Vaping Use: Never used   Substance and Sexual Activity    Alcohol use: Not Currently     Comment: once in a while has a glass of wine with wife    Drug use: Never    Sexual activity: Not Currently   Social History Narrative     with three children, wife is caregive. 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    Hobbies: Use to carve wood, use to draw, paint, used to do rock work & carpentry, able to carve  walking sticks at times, hard with limited right hand function.       ROS: 10 point ROS neg other than the symptoms noted above in the HPI.  EXAM  Constitutional: healthy, alert, and no distress    Psychiatric: mentation appears normal and affect normal/bright    VASCULAR:  -Dorsalis pedis pulse +0/4 b/l secondary to edema  -Posterior tibial pulse +2/4 b/l secondary to edema  -Capillary refill time < 3 seconds to b/l hallux  -Hair growth absent to b/l anterior legs and ankles  -Varicosities and telangiectasias to foot, bilaterally   -Moderate 3+ pitting edema to foot and ankle, bilaterally   NEURO:  -Light touch sensation intact to b/l plantar forefoot  DERM:  -Skin temperature within normal limits to bilateral foot  -No maceration between the toes  -Toenails elongated, dystrophic and discolored x 10  -Significant edema with diffuse pitting of skin to bilateral foot (most prominent to the toes) and atrophic skin to the toes (most diffuse to the bilateral second toe)  MSK:  -Moderate flexibility to all MTPJs of toes (toes are not rigidly contracted and stuck together)  -Muscle strength of ankles +5/5 for dorsiflexion, plantarflexion, ABDUction and ADDuction b/l    ============================================================    ASSESSMENT:    (E11.42) Diabetic polyneuropathy associated with type 2 diabetes mellitus (H)  (primary encounter diagnosis)    (L60.3) Onychodystrophy    (Z86.31) Personal history of diabetic foot ulcer    (E11.9) Diabetes mellitus type 2, noninsulin dependent (H)    (Z13.89) Screening for diabetic peripheral neuropathy        PLAN:  -Patient evaluated and examined. Treatment options discussed with no educational barriers noted.    -High risk toenail debridement x 10 toenails without incident    -Diabetic Foot Education provided. This included checking the feet daily looking for new new blisters or wounds, wearing shoes at all times when walking including around the house, and avoiding lotion  application between the toes. If there are any signs of infection, the patient should present to the ED as soon as possible. Infections of the foot can be life threatening or lead to amputations of the foot or leg.    Diabetes Mellitus: Patient's DM is managed by their PCP. The DM appears to be stable. Patient's last HbA1C was 6.7% on 02/07/2025.    -Lymphedema is stable and managed by the patient. No open wounds although this places him at higher risk of open wounds.    -Patient in agreement with the above treatment plan and all of patient's questions were answered.      Return to clinic four months for a diabetic foot exam and high risk nail debridement per patient request        Maryam Chairez DPM

## 2025-04-20 ENCOUNTER — HEALTH MAINTENANCE LETTER (OUTPATIENT)
Age: 80
End: 2025-04-20

## 2025-08-03 ENCOUNTER — HEALTH MAINTENANCE LETTER (OUTPATIENT)
Age: 80
End: 2025-08-03

## 2025-08-13 ENCOUNTER — OFFICE VISIT (OUTPATIENT)
Dept: PODIATRY | Facility: OTHER | Age: 80
End: 2025-08-13
Attending: PODIATRIST
Payer: COMMERCIAL

## 2025-08-13 VITALS
TEMPERATURE: 97.2 F | RESPIRATION RATE: 16 BRPM | OXYGEN SATURATION: 97 % | DIASTOLIC BLOOD PRESSURE: 82 MMHG | SYSTOLIC BLOOD PRESSURE: 154 MMHG | HEART RATE: 60 BPM

## 2025-08-13 DIAGNOSIS — E11.9 DIABETES MELLITUS TYPE 2, NONINSULIN DEPENDENT (H): ICD-10-CM

## 2025-08-13 DIAGNOSIS — Z13.89 SCREENING FOR DIABETIC PERIPHERAL NEUROPATHY: ICD-10-CM

## 2025-08-13 DIAGNOSIS — E11.42 DIABETIC POLYNEUROPATHY ASSOCIATED WITH TYPE 2 DIABETES MELLITUS (H): Primary | ICD-10-CM

## 2025-08-13 DIAGNOSIS — Z86.31 PERSONAL HISTORY OF DIABETIC FOOT ULCER: ICD-10-CM

## 2025-08-13 DIAGNOSIS — L60.3 ONYCHODYSTROPHY: ICD-10-CM

## 2025-08-13 PROCEDURE — G0463 HOSPITAL OUTPT CLINIC VISIT: HCPCS

## 2025-08-13 PROCEDURE — 11721 DEBRIDE NAIL 6 OR MORE: CPT | Performed by: PODIATRIST

## 2025-08-13 ASSESSMENT — PAIN SCALES - GENERAL: PAINLEVEL_OUTOF10: MILD PAIN (3)
